# Patient Record
Sex: FEMALE | Race: WHITE | Employment: FULL TIME | URBAN - METROPOLITAN AREA
[De-identification: names, ages, dates, MRNs, and addresses within clinical notes are randomized per-mention and may not be internally consistent; named-entity substitution may affect disease eponyms.]

---

## 2020-04-13 ENCOUNTER — APPOINTMENT (OUTPATIENT)
Dept: GENERAL RADIOLOGY | Age: 60
End: 2020-04-13
Payer: COMMERCIAL

## 2020-04-13 ENCOUNTER — HOSPITAL ENCOUNTER (EMERGENCY)
Age: 60
Discharge: HOME | End: 2020-04-13
Attending: EMERGENCY MEDICINE
Payer: COMMERCIAL

## 2020-04-13 VITALS
HEART RATE: 80 BPM | TEMPERATURE: 98.5 F | WEIGHT: 235 LBS | DIASTOLIC BLOOD PRESSURE: 97 MMHG | RESPIRATION RATE: 16 BRPM | HEIGHT: 67 IN | SYSTOLIC BLOOD PRESSURE: 136 MMHG | OXYGEN SATURATION: 96 % | BODY MASS INDEX: 36.88 KG/M2

## 2020-04-13 PROCEDURE — 73562 X-RAY EXAM OF KNEE 3: CPT

## 2020-04-13 PROCEDURE — 6370000000 HC RX 637 (ALT 250 FOR IP): Performed by: EMERGENCY MEDICINE

## 2020-04-13 PROCEDURE — 99283 EMERGENCY DEPT VISIT LOW MDM: CPT

## 2020-04-13 RX ORDER — ACETAMINOPHEN 500 MG
1000 TABLET ORAL EVERY 4 HOURS PRN
Status: DISCONTINUED | OUTPATIENT
Start: 2020-04-13 | End: 2020-04-13 | Stop reason: HOSPADM

## 2020-04-13 RX ORDER — ACETAMINOPHEN 500 MG
1000 TABLET ORAL ONCE
Status: CANCELLED | OUTPATIENT
Start: 2020-04-13 | End: 2020-04-13

## 2020-04-13 RX ADMIN — ACETAMINOPHEN 1000 MG: 500 TABLET ORAL at 14:38

## 2020-04-13 SDOH — HEALTH STABILITY: MENTAL HEALTH: HOW OFTEN DO YOU HAVE A DRINK CONTAINING ALCOHOL?: NEVER

## 2020-04-13 ASSESSMENT — PAIN SCALES - GENERAL
PAINLEVEL_OUTOF10: 9
PAINLEVEL_OUTOF10: 5
PAINLEVEL_OUTOF10: 9

## 2020-04-13 ASSESSMENT — PAIN DESCRIPTION - PAIN TYPE: TYPE: CHRONIC PAIN

## 2020-04-13 ASSESSMENT — ENCOUNTER SYMPTOMS
WHEEZING: 0
RHINORRHEA: 0
NAUSEA: 0
PHOTOPHOBIA: 0
BACK PAIN: 0
ABDOMINAL DISTENTION: 0
COUGH: 0
VOMITING: 0
SHORTNESS OF BREATH: 0
DIARRHEA: 0

## 2020-04-13 ASSESSMENT — PAIN DESCRIPTION - ORIENTATION: ORIENTATION: RIGHT

## 2020-04-13 ASSESSMENT — PAIN DESCRIPTION - LOCATION: LOCATION: KNEE

## 2020-04-13 NOTE — ED PROVIDER NOTES
hematuria. Musculoskeletal: Positive for arthralgias. Negative for back pain and neck pain. Skin: Negative for rash and wound. Neurological: Negative for syncope and weakness. Psychiatric/Behavioral: Negative for agitation and confusion. Exam  ED Triage Vitals [04/13/20 1348]   BP Temp Temp Source Pulse Resp SpO2 Height Weight   (!) 178/93 98.2 °F (36.8 °C) Oral 90 16 95 % 5' 7\" (1.702 m) 235 lb (106.6 kg)       Physical Exam  Vitals signs and nursing note reviewed. Constitutional:       General: She is not in acute distress. Appearance: She is well-developed. HENT:      Head: Normocephalic and atraumatic. Eyes:      Pupils: Pupils are equal, round, and reactive to light. Neck:      Musculoskeletal: Normal range of motion and neck supple. Cardiovascular:      Rate and Rhythm: Normal rate and regular rhythm. Heart sounds: No murmur. Pulmonary:      Effort: Pulmonary effort is normal.      Breath sounds: Normal breath sounds. Abdominal:      General: There is no distension. Palpations: Abdomen is soft. Tenderness: There is no abdominal tenderness. Musculoskeletal: Normal range of motion. General: No deformity. Comments: The right knee is not significantly enlarged compared to contralateral side, there is no significant tenderness along the medial or lateral lines of the joint. There is small effusion noted around the patella. Near normal active and passive range of motion of the knee and all other joints lower extremities. Negative anterior drawer sign. Neurovascular intact distally. Skin:     General: Skin is warm. Findings: No rash. Neurological:      Mental Status: She is alert and oriented to person, place, and time. Motor: No abnormal muscle tone.    Psychiatric:         Behavior: Behavior normal.           ED Course    ED Medication Orders (From admission, onward)    Start Ordered     Status Ordering Provider    04/13/20 0209

## 2020-04-13 NOTE — ED NOTES
Discharge instructions were reviewed with the patient and the patient verbalized understanding. Patient IV was removed with no complications. Patient stable and brought out of ED via wheelchair, picked up by her .        Lolita Rao RN  04/13/20 9241

## 2020-04-16 ENCOUNTER — OFFICE VISIT (OUTPATIENT)
Dept: ORTHOPEDIC SURGERY | Age: 60
End: 2020-04-16
Payer: COMMERCIAL

## 2020-04-16 VITALS
DIASTOLIC BLOOD PRESSURE: 92 MMHG | WEIGHT: 230 LBS | SYSTOLIC BLOOD PRESSURE: 136 MMHG | HEIGHT: 65 IN | BODY MASS INDEX: 38.32 KG/M2

## 2020-04-16 PROCEDURE — L1812 KO ELASTIC W/JOINTS PRE OTS: HCPCS | Performed by: ORTHOPAEDIC SURGERY

## 2020-04-16 PROCEDURE — 99203 OFFICE O/P NEW LOW 30 MIN: CPT | Performed by: ORTHOPAEDIC SURGERY

## 2020-04-16 PROCEDURE — 20611 DRAIN/INJ JOINT/BURSA W/US: CPT | Performed by: ORTHOPAEDIC SURGERY

## 2020-04-16 RX ORDER — METHYLPREDNISOLONE ACETATE 40 MG/ML
80 INJECTION, SUSPENSION INTRA-ARTICULAR; INTRALESIONAL; INTRAMUSCULAR; SOFT TISSUE ONCE
Status: COMPLETED | OUTPATIENT
Start: 2020-04-16 | End: 2020-04-16

## 2020-04-16 RX ORDER — LIDOCAINE HYDROCHLORIDE 10 MG/ML
4 INJECTION, SOLUTION INFILTRATION; PERINEURAL ONCE
Status: COMPLETED | OUTPATIENT
Start: 2020-04-16 | End: 2020-04-16

## 2020-04-16 RX ORDER — BUPIVACAINE HYDROCHLORIDE 2.5 MG/ML
4 INJECTION, SOLUTION INFILTRATION; PERINEURAL ONCE
Status: COMPLETED | OUTPATIENT
Start: 2020-04-16 | End: 2020-04-16

## 2020-04-16 RX ADMIN — METHYLPREDNISOLONE ACETATE 80 MG: 40 INJECTION, SUSPENSION INTRA-ARTICULAR; INTRALESIONAL; INTRAMUSCULAR; SOFT TISSUE at 10:26

## 2020-04-16 RX ADMIN — LIDOCAINE HYDROCHLORIDE 4 ML: 10 INJECTION, SOLUTION INFILTRATION; PERINEURAL at 10:26

## 2020-04-16 RX ADMIN — BUPIVACAINE HYDROCHLORIDE 10 MG: 2.5 INJECTION, SOLUTION INFILTRATION; PERINEURAL at 10:27

## 2020-04-16 NOTE — LETTER
[place] [id number] [facility] [address]  Phone: [phone]  Fax: [phone]    Olinda Wilson MD        April 16, 2020     Patient: Jimena Ortiz   YOB: 1960   Date of Visit: 4/16/2020       To Whom It May Concern: It is my medical opinion that Jimena Ortiz needs to remain out of work due to orthopedic injury of her Left knee through May 3, 2020. Anticipated return to work date on May 4, 2020. If you have any questions or concerns, please don't hesitate to call.     Sincerely,        Olinda Wilson MD

## 2020-04-16 NOTE — PROGRESS NOTES
CHIEF COMPLAINT:    Chief Complaint   Patient presents with    Knee Pain     Right Knee -= OA  ER 04/13/2020       HISTORY OF PRESENT ILLNESS:    Presents today for evaluation treatment of her right knee. She reports that she has been in the emergency room a couple of days ago with ongoing disabling right knee pain is been bothering her for about 6 months. Is now gotten to the point that she is having to ambulate with with crutches. The patient is a 60 y.o. female   No past medical history on file. Work Status: She works as a  for a business. She does have to do some ambulating. The pain assessment was noted & is as follows:  Pain Assessment  Location of Pain: Knee  Quality of Pain: Sharp, Dull, Throbbing  Duration of Pain: Persistent  Frequency of Pain: Constant  Date Pain First Started: 04/12/20  Aggravating Factors: Squatting, Standing, Walking  Limiting Behavior: Yes  Result of Injury: No]      Work Status/Functionality:     Past Medical History: Medical history form was reviewed today & can be found in the media tab  No past medical history on file. Past Surgical History:     Past Surgical History:   Procedure Laterality Date    APPENDECTOMY      TONSILLECTOMY       Current Medications:   No current outpatient medications on file. Allergies:  Patient has no known allergies. Social History:    reports that she has quit smoking. Her smoking use included cigarettes. She does not have any smokeless tobacco history on file. She reports that she does not drink alcohol or use drugs. Family History:   No family history on file. REVIEW OF SYSTEMS:   For new problems, a full review of systems will be found scanned in the patient's chart.   CONSTITUTIONAL: Denies unexplained weight loss, fevers, chills   NEUROLOGICAL: Denies unsteady gait or progressive weakness  SKIN: Denies skin changes, delayed healing, rash, itching       PHYSICAL EXAM:    Vitals: Blood pressure (!) 136/92, talked about treatment of arthritis and the various options that are involved with this. The patient understands that the treatments can vary from essentially doing nothing to a total joint replacement arthroplasty for arthritis. I then went on to describe the utilization of glucosamine and chondroitin sulfate as a joint nutrition product. We talked about the fact that this is essentially a joint vitamin with typically minimal side effects. We also talked about utilization of prescription over-the-counter anti-inflammatory medications as the next option. We also discussed the possibility of brace wear or orthotic wear if the patient has significant varus alignment. We discussed the importance of a reasonable body mass as well. They understand that obesity can impact joint pain and accelerate the development of osteoarthritis. We then went on to discuss the possibility of Visco supplementation with hyaluronate products. We talked about the typical course of this type of treatment and the fact that often times in the treatment for significant arthritis, this is successful less than half the time. We also talked about the corticosteroid injections and the fact that this can give a brief window of relief, but does not cure the problem; in fact, the pain often has a rebound effect in 6-10 weeks after the steroid has worn off. We also discussed arthroscopy surgery in attempts to debride the joint, but the fact that this is relatively unreliable treatment in the face of significant arthritis. It can occasionally be used, particularly if there is significant meniscus pathology. Lastly we discussed total joint replacement arthroplasty as the final and definitive step in treatment of arthritis. Patient realizes the magnitude of this type of treatment as well as having voiced a general understanding to the duration of the prosthesis. The patient voiced understanding to these continuum of treatment options.     Although she is ligamentously stable today, we need to continually monitor the integrity of her MCL. Were going administer a right knee cortisone injection today and use a brace for support and stability. 2 weeks off work. Follow-up in approximately 4 to 6 weeks for reevaluation. She understands she will likely need a joint replacement, possibly sooner rather than later depending on the integrity of her ligamentous structures    I discussed in detail the risks, benefits and complications of an injection which included but are not limited to infection, skin reactions, hot swollen joint, and anaphylaxis with the patient. The patient verbalized understanding and gave informed consent for the injection. The patient's knee was flexed to 90° and the skin prepped using sterile alcohol solution. A sterile 22-gauge needle was inserted into the knee and the mixture of 4 mL of 2% Carbocaine, 4 mL of 0.25% Marcaine, and 80 mg of Depo-Medrol was injected under sterile technique. The needle was withdrawn and the puncture site sealed with a Band-Aid. Technique: Under sterile conditions a [employer] Once! ultrasound unit with a variable frequency (6.0-15.0 MHz) linear transducer was used to localize the placement of a 22-gauge needle into the Right knee joint. Findings: Successful needle placement for knee injection. Final images were taken and saved for permanent record. The patient tolerated the injection well. The patient was instructed to call the office immediately if there is any pain, redness, warmth, fever, or chills. I have personally performed and/or participated in the history, exam and medical decision making and agree with all pertinent clinical information. I have also reviewed and agree with the past medical, family and social history unless otherwise noted. This dictation was performed with a verbal recognition program (DRAGON) and it was checked for errors.  It is possible that there are still dictated errors within this office note. If so, please bring any errors to my attention for an addendum. All efforts were made to ensure that this office note is accurate.           Val Coronel MD

## 2020-05-14 ENCOUNTER — OFFICE VISIT (OUTPATIENT)
Dept: ORTHOPEDIC SURGERY | Age: 60
End: 2020-05-14
Payer: COMMERCIAL

## 2020-05-14 VITALS — HEIGHT: 65 IN | WEIGHT: 229.94 LBS | BODY MASS INDEX: 38.31 KG/M2

## 2020-05-14 PROCEDURE — 99213 OFFICE O/P EST LOW 20 MIN: CPT | Performed by: ORTHOPAEDIC SURGERY

## 2020-05-14 NOTE — PROGRESS NOTES
5' 5\" (1.651 m), weight 229 lb 15 oz (104.3 kg). GENERAL EXAM:  · General Apparence: Patient is adequately groomed with no evidence of malnutrition. · Orientation: The patient is oriented to time, place and person. · Mood & Affect:The patient's mood and affect are appropriate       Knee PHYSICAL EXAMINATION:  · Inspection: Valgus alignment of about 24 degrees. 1+ effusion. · Palpation: Tenderness along the lateral joint space and peripatellar region primarily. She also some discomfort along the course of the MCL. · Range of Motion: 0-1 and 35 degrees. · Strength: 5/5    · Special Tests: MCL is completely intact at 0 30 and 60 degrees of flexion but she does have pain on valgus testing. LCL ACL and PCL are all intact. · Skin:  There are no rashes, ulcerations or lesions. · There are no distal dysvascular changes     Gait & station: Utilizing a cane      Additional Examinations:        Is similar findings on the LEFT knee. Fairly dramatic valgus alignment with no MCL instability. Good range of motion. No gross instability. Logroll examination. Negative straight leg raise examination. Good hip range of motion. Diagnostic Testing: The following x rays were read and interpreted by myself     The following x rays were read and interpreted by myself                       I reviewed x-rays from the emergency department and a single AP standing x-ray obtained today in the office. The patient has absolutely end-stage bone-on-bone osteoarthritic change of the lateral compartment and severe patellofemoral disease as well. Valgus angles approximately 25 degrees     Orders   No orders of the defined types were placed in this encounter. Assessment / Treatment Plan:     1. End-stage lateral compartment patellofemoral arthritis of the right knee. We discussed treatment options again at length. We talked about Visco supplementation brace work such a.   She wants to go ahead and pursue joint replacement surgery. We talked about the surgery in detail close rehabilitation. She could expect probably 6 or 8 weeks off of work. 2.  Demand  matching tool completed. She matches to a [employer] replacement. We discussed the risk, benefits, and potential complications of total knee replacement arthroplasty surgery. The patient voiced their understanding to concerns that include infection, deep vein thrombosis, neurological injury, and delayed rehabilitation. The patient also realizes that there are concerns regarding the potential need for manipulation under anesthesia if range of motion proves to be problematic. The patient also understands that there is always a chance of dystrophy and anesthetic complications that would include a stroke, cardiopulmonary pathology, and even death. We also discussed the rehabilitation involved with this operation and options that involved not only the hospitalization but then the potential of either going home with visiting home therapy versus going to a rehabilitation facility. We talked about the nuances of each of these treatments. The patient also realizes the need for the knee brace in the rehabilitation process as well as the very significant role that the patient plays in terms of rehabilitation after this type of operation. All questions were answered. 3.  See no specific complicating factors for her. I have personally performed and/or participated in the history, exam and medical decision making and agree with all pertinent clinical information. I have also reviewed and agree with the past medical, family and social history unless otherwise noted. This dictation was performed with a verbal recognition program (DRAGON) and it was checked for errors. It is possible that there are still dictated errors within this office note. If so, please bring any errors to my attention for an addendum.  All efforts were made to ensure

## 2020-06-17 ENCOUNTER — TELEPHONE (OUTPATIENT)
Dept: ORTHOPEDIC SURGERY | Age: 60
End: 2020-06-17

## 2020-06-23 ENCOUNTER — TELEPHONE (OUTPATIENT)
Dept: ORTHOPEDIC SURGERY | Age: 60
End: 2020-06-23

## 2020-06-23 NOTE — TELEPHONE ENCOUNTER
COVID; 7/8/2020- Negative   Pt got labs done at PCP    Pt would like to DC same day if possible  No preference on [address]    Orthopedic Nurse Navigator Summary  -  Patient Name: Jorje Severin  Anticipated Date of Surgery:  Using OrthoVitals? Yes, Are they Registered: Yes  Attended Pre-Op Education Class: If No, why not? PCP:  Phone #:  Date of PCP Visit for H&P: 06/29/2020  Any Noted Concerns from PCP prior to surgery: No  If Yes, what concerns?:  Is the Patient in a Pain Management Program?: No  Review of Past Medical History Reveals History of:  -  Critical Lab Values:  - Hemoglobin (g/dL): Date Value  - Hematocrit (%): Date Value  - HgbA1C : Date Value  - Albumin : Date Value  - BUN (mg/dL) : Date Value  - CREATININE (mg/dL) : Date Value  - BMI (kg/m2) : Date Value  -  Coronary Artery Disease/HTN/CHF History: No  Cardiologist:  Cardiac Clearance Necessary:  Date of Cardiac Clearance Appt: On Plavix? No  If YES, when will they stop taking? Final Cardiac Recommendations: On any anticoagulation: No  -  Diabetes History: No  Most Recent HgbA1C:  PCP or Endocrine Recommendations:  Nutritionist/Dietician Consult Scheduled:  Final Plan For Diabetic Control:  Pulmonary: COPD/Emphysema/ Use of home oxygen: NONE  Alcohol use: Non-Drinker  -  DVT Risk Stratification: Low Risk  Vascular Consult Ordered:  Date of Vascular Appt:  Hematology/Oncology Consult Ordered:  Date of Hematology/Oncology Appt:  Final Recommendation For DVT Prophylaxis:  Smoking history: Non-Smoker  Use of Estrogen:  -  BMI Greater than 40 at time of scheduling?: No  Has Surgeon been notified of BMI concern? No  Weight Loss Clinic Consult Ordered No  Date of Wt Loss Clinic Appt:  BMI at time of surgery (if went through [facility]):  -  Additional Medical Concerns: Additional Recommendations for above concerns:  Attended Pre-Hab Program: No  Anticipated Discharge Disposition: D/C home  Who will be with patient at home following discharge?  Significant other will be home, also sister can check in  Equipment patient already has: Shower seat, cane  Bedroom on first or second floor: First  Bathroom on first or second floor: First  Weight bearing status: full  Pre-op ambulatory status:  Number of entry steps: ZERO  Caregiver assistance: Full time  Coreen Mayorga [facility]  06/30/2020

## 2020-07-01 ENCOUNTER — TELEPHONE (OUTPATIENT)
Dept: ORTHOPEDIC SURGERY | Age: 60
End: 2020-07-01

## 2020-07-06 ENCOUNTER — ANESTHESIA EVENT (OUTPATIENT)
Dept: OPERATING ROOM | Age: 60
End: 2020-07-06
Payer: COMMERCIAL

## 2020-07-08 ENCOUNTER — TELEPHONE (OUTPATIENT)
Dept: ORTHOPEDIC SURGERY | Age: 60
End: 2020-07-08

## 2020-07-08 ENCOUNTER — HOSPITAL ENCOUNTER (OUTPATIENT)
Dept: PREADMISSION TESTING | Age: 60
Discharge: HOME | End: 2020-07-12
Payer: COMMERCIAL

## 2020-07-08 ENCOUNTER — OFFICE VISIT (OUTPATIENT)
Dept: PRIMARY CARE CLINIC | Age: 60
End: 2020-07-08
Payer: COMMERCIAL

## 2020-07-08 LAB
ABO/RH: NORMAL
ANTIBODY SCREEN: NORMAL
BILIRUBIN URINE: NEGATIVE
BLOOD, URINE: ABNORMAL
CLARITY: CLEAR
COLOR: YELLOW
EPITHELIAL CELLS, UA: ABNORMAL /HPF (ref 0–5)
GLUCOSE URINE: NEGATIVE MG/DL
KETONES, URINE: NEGATIVE MG/DL
LEUKOCYTE ESTERASE, URINE: NEGATIVE
MICROSCOPIC EXAMINATION: YES
MUCUS: ABNORMAL /LPF
NITRITE, URINE: NEGATIVE
PH UA: 6 (ref 5–8)
PROTEIN UA: NEGATIVE MG/DL
RBC UA: ABNORMAL /HPF (ref 0–4)
SPECIFIC GRAVITY UA: >=1.03 (ref 1–1.03)
TRANSFERRIN: 243 MG/DL (ref 200–360)
URINE TYPE: ABNORMAL
UROBILINOGEN, URINE: 0.2 E.U./DL
WBC UA: ABNORMAL /HPF (ref 0–5)

## 2020-07-08 PROCEDURE — 84466 ASSAY OF TRANSFERRIN: CPT

## 2020-07-08 PROCEDURE — 86901 BLOOD TYPING SEROLOGIC RH(D): CPT

## 2020-07-08 PROCEDURE — 86850 RBC ANTIBODY SCREEN: CPT

## 2020-07-08 PROCEDURE — 81001 URINALYSIS AUTO W/SCOPE: CPT

## 2020-07-08 PROCEDURE — 99211 OFF/OP EST MAY X REQ PHY/QHP: CPT | Performed by: NURSE PRACTITIONER

## 2020-07-08 PROCEDURE — 36415 COLL VENOUS BLD VENIPUNCTURE: CPT

## 2020-07-08 PROCEDURE — 87086 URINE CULTURE/COLONY COUNT: CPT

## 2020-07-08 PROCEDURE — 86900 BLOOD TYPING SEROLOGIC ABO: CPT

## 2020-07-08 NOTE — PROGRESS NOTES
Jennie Crockett received a viral test for COVID-19. They were educated on isolation and quarantine as appropriate. For any symptoms, they were directed to seek care from their PCP, given contact information to establish with a doctor, directed to an urgent care or the emergency room.

## 2020-07-08 NOTE — TELEPHONE ENCOUNTER
Breanna [facility] [id number]    Date: 07/14/20  Type of SX:  INPATIENT CHANGED TO OUTPATIENT/23 HOUR HOLD  Location: [facility]  CPT: 28923   DX Code: M17.11  SX area: [place] KN[place]  Insurance: [address] [place] [address] 07/14/20 TO 10/12/20

## 2020-07-09 LAB — URINE CULTURE, ROUTINE: NORMAL

## 2020-07-09 NOTE — PROGRESS NOTES
Obstructive Sleep Apnea (IVET) Screening     Patient:  Tracey Trevino    YOB: 1960      Medical Record #:  [id number]                     Date:  7/9/2020     1. Are you a loud and/or regular snorer? [x]  Yes       [] No    2. Have you been observed to gasp or stop breathing during sleep? []  Yes       [x] No    3. Do you feel tired or groggy upon awakening or do you awaken with a headache?           []  Yes       [x] No    4. Are you often tired or fatigued during the wake time hours? []  Yes       [x] No    5. Do you fall asleep sitting, reading, watching TV or driving? []  Yes       [x] No    6. Do you often have problems with memory or concentration? []  Yes       [x] No    **If patient's score is ? 3 they are considered high risk for IVET. An Anesthesia provider will evaluate the patient and develop a plan of care the day of surgery. Note:  If the patient's BMI is more than 35 kg m¯² , has neck circumference > 40 cm, and/or high blood pressure the risk is greater (© American Sleep Apnea Association, 2006).

## 2020-07-11 LAB
SARS-COV-2: NOT DETECTED
SOURCE: NORMAL

## 2020-07-14 ENCOUNTER — ANESTHESIA (OUTPATIENT)
Dept: OPERATING ROOM | Age: 60
End: 2020-07-14
Payer: COMMERCIAL

## 2020-07-14 ENCOUNTER — APPOINTMENT (OUTPATIENT)
Dept: GENERAL RADIOLOGY | Age: 60
End: 2020-07-14
Attending: ORTHOPAEDIC SURGERY
Payer: COMMERCIAL

## 2020-07-14 ENCOUNTER — HOSPITAL ENCOUNTER (OUTPATIENT)
Age: 60
Discharge: HOME | End: 2020-07-15
Attending: ORTHOPAEDIC SURGERY | Admitting: ORTHOPAEDIC SURGERY
Payer: COMMERCIAL

## 2020-07-14 VITALS
TEMPERATURE: 98.2 F | OXYGEN SATURATION: 97 % | DIASTOLIC BLOOD PRESSURE: 60 MMHG | SYSTOLIC BLOOD PRESSURE: 91 MMHG | RESPIRATION RATE: 15 BRPM

## 2020-07-14 PROBLEM — Z96.651 STATUS POST TOTAL RIGHT KNEE REPLACEMENT: Status: ACTIVE | Noted: 2020-07-14

## 2020-07-14 PROBLEM — Z96.651 S/P TOTAL KNEE ARTHROPLASTY, RIGHT: Status: ACTIVE | Noted: 2020-07-14

## 2020-07-14 LAB
ABO/RH: NORMAL
ANION GAP SERPL CALCULATED.3IONS-SCNC: 12 MMOL/L (ref 3–16)
ANTIBODY SCREEN: NORMAL
BUN BLDV-MCNC: 11 MG/DL (ref 7–20)
CALCIUM SERPL-MCNC: 8.3 MG/DL (ref 8.3–10.6)
CHLORIDE BLD-SCNC: 107 MMOL/L (ref 99–110)
CO2: 24 MMOL/L (ref 21–32)
CREAT SERPL-MCNC: <0.5 MG/DL (ref 0.6–1.2)
GFR AFRICAN AMERICAN: >60
GFR NON-AFRICAN AMERICAN: >60
GLUCOSE BLD-MCNC: 94 MG/DL (ref 70–99)
HCT VFR BLD CALC: 41.3 % (ref 36–48)
HEMOGLOBIN: 13.6 G/DL (ref 12–16)
MAGNESIUM: 2.1 MG/DL (ref 1.8–2.4)
MCH RBC QN AUTO: 28.9 PG (ref 26–34)
MCHC RBC AUTO-ENTMCNC: 33 G/DL (ref 31–36)
MCV RBC AUTO: 87.7 FL (ref 80–100)
PDW BLD-RTO: 13.7 % (ref 12.4–15.4)
PLATELET # BLD: 261 K/UL (ref 135–450)
PMV BLD AUTO: 7.5 FL (ref 5–10.5)
POTASSIUM REFLEX MAGNESIUM: 3.5 MMOL/L (ref 3.5–5.1)
RBC # BLD: 4.71 M/UL (ref 4–5.2)
SODIUM BLD-SCNC: 143 MMOL/L (ref 136–145)
WBC # BLD: 8.1 K/UL (ref 4–11)

## 2020-07-14 PROCEDURE — 6370000000 HC RX 637 (ALT 250 FOR IP): Performed by: ANESTHESIOLOGY

## 2020-07-14 PROCEDURE — 2500000003 HC RX 250 WO HCPCS: Performed by: ANESTHESIOLOGY

## 2020-07-14 PROCEDURE — 3700000001 HC ADD 15 MINUTES (ANESTHESIA): Performed by: ORTHOPAEDIC SURGERY

## 2020-07-14 PROCEDURE — 86850 RBC ANTIBODY SCREEN: CPT

## 2020-07-14 PROCEDURE — 3700000000 HC ANESTHESIA ATTENDED CARE: Performed by: ORTHOPAEDIC SURGERY

## 2020-07-14 PROCEDURE — 76942 ECHO GUIDE FOR BIOPSY: CPT | Performed by: ANESTHESIOLOGY

## 2020-07-14 PROCEDURE — 2500000003 HC RX 250 WO HCPCS: Performed by: NURSE ANESTHETIST, CERTIFIED REGISTERED

## 2020-07-14 PROCEDURE — 88311 DECALCIFY TISSUE: CPT

## 2020-07-14 PROCEDURE — 2500000003 HC RX 250 WO HCPCS: Performed by: ORTHOPAEDIC SURGERY

## 2020-07-14 PROCEDURE — 6360000002 HC RX W HCPCS: Performed by: ANESTHESIOLOGY

## 2020-07-14 PROCEDURE — 88305 TISSUE EXAM BY PATHOLOGIST: CPT

## 2020-07-14 PROCEDURE — 3600000005 HC SURGERY LEVEL 5 BASE: Performed by: ORTHOPAEDIC SURGERY

## 2020-07-14 PROCEDURE — 97166 OT EVAL MOD COMPLEX 45 MIN: CPT

## 2020-07-14 PROCEDURE — 6360000002 HC RX W HCPCS: Performed by: PHYSICIAN ASSISTANT

## 2020-07-14 PROCEDURE — 6360000002 HC RX W HCPCS: Performed by: ORTHOPAEDIC SURGERY

## 2020-07-14 PROCEDURE — C1713 ANCHOR/SCREW BN/BN,TIS/BN: HCPCS | Performed by: ORTHOPAEDIC SURGERY

## 2020-07-14 PROCEDURE — 97530 THERAPEUTIC ACTIVITIES: CPT

## 2020-07-14 PROCEDURE — 7100000001 HC PACU RECOVERY - ADDTL 15 MIN: Performed by: ORTHOPAEDIC SURGERY

## 2020-07-14 PROCEDURE — 6370000000 HC RX 637 (ALT 250 FOR IP): Performed by: PHYSICIAN ASSISTANT

## 2020-07-14 PROCEDURE — 97162 PT EVAL MOD COMPLEX 30 MIN: CPT

## 2020-07-14 PROCEDURE — 80048 BASIC METABOLIC PNL TOTAL CA: CPT

## 2020-07-14 PROCEDURE — 2709999900 HC NON-CHARGEABLE SUPPLY: Performed by: ORTHOPAEDIC SURGERY

## 2020-07-14 PROCEDURE — 64447 NJX AA&/STRD FEMORAL NRV IMG: CPT | Performed by: ANESTHESIOLOGY

## 2020-07-14 PROCEDURE — 2580000003 HC RX 258: Performed by: ANESTHESIOLOGY

## 2020-07-14 PROCEDURE — 85027 COMPLETE CBC AUTOMATED: CPT

## 2020-07-14 PROCEDURE — 97110 THERAPEUTIC EXERCISES: CPT

## 2020-07-14 PROCEDURE — 6370000000 HC RX 637 (ALT 250 FOR IP): Performed by: INTERNAL MEDICINE

## 2020-07-14 PROCEDURE — 7100000000 HC PACU RECOVERY - FIRST 15 MIN: Performed by: ORTHOPAEDIC SURGERY

## 2020-07-14 PROCEDURE — 86901 BLOOD TYPING SEROLOGIC RH(D): CPT

## 2020-07-14 PROCEDURE — 83735 ASSAY OF MAGNESIUM: CPT

## 2020-07-14 PROCEDURE — C1776 JOINT DEVICE (IMPLANTABLE): HCPCS | Performed by: ORTHOPAEDIC SURGERY

## 2020-07-14 PROCEDURE — 2580000003 HC RX 258: Performed by: PHYSICIAN ASSISTANT

## 2020-07-14 PROCEDURE — C9290 INJ, BUPIVACAINE LIPOSOME: HCPCS | Performed by: ORTHOPAEDIC SURGERY

## 2020-07-14 PROCEDURE — 73560 X-RAY EXAM OF KNEE 1 OR 2: CPT

## 2020-07-14 PROCEDURE — 86900 BLOOD TYPING SEROLOGIC ABO: CPT

## 2020-07-14 PROCEDURE — 2580000003 HC RX 258: Performed by: ORTHOPAEDIC SURGERY

## 2020-07-14 PROCEDURE — 6360000002 HC RX W HCPCS: Performed by: NURSE ANESTHETIST, CERTIFIED REGISTERED

## 2020-07-14 PROCEDURE — 3600000015 HC SURGERY LEVEL 5 ADDTL 15MIN: Performed by: ORTHOPAEDIC SURGERY

## 2020-07-14 DEVICE — RALLY HV BONE CEMENT 40 GRAMS
Type: IMPLANTABLE DEVICE | Site: KNEE | Status: FUNCTIONAL
Brand: RALLY

## 2020-07-14 DEVICE — GENESIS II RESURFACING PATELLAR                                    PROSTHESIS  32MM
Type: IMPLANTABLE DEVICE | Site: KNEE | Status: FUNCTIONAL
Brand: GENESIS II

## 2020-07-14 DEVICE — LEGION POSTERIOR STABILIZED HIGH                                    FLEX HIGHLY CROSS LINKED                                    POLYETHYLENE SIZE 3-4 9MM
Type: IMPLANTABLE DEVICE | Site: KNEE | Status: FUNCTIONAL
Brand: LEGION

## 2020-07-14 DEVICE — LEGION POSTERIOR STABILIZED                                    OXINIUM FEMORAL SIZE 5 RIGHT
Type: IMPLANTABLE DEVICE | Site: KNEE | Status: FUNCTIONAL
Brand: LEGION

## 2020-07-14 DEVICE — GENESIS II NON-POROUS TIBIAL                                    BASEPLATE SIZE 4 RIGHT
Type: IMPLANTABLE DEVICE | Site: KNEE | Status: FUNCTIONAL
Brand: GENESIS II

## 2020-07-14 RX ORDER — FENTANYL CITRATE 50 UG/ML
25 INJECTION, SOLUTION INTRAMUSCULAR; INTRAVENOUS EVERY 5 MIN PRN
Status: DISCONTINUED | OUTPATIENT
Start: 2020-07-14 | End: 2020-07-14 | Stop reason: HOSPADM

## 2020-07-14 RX ORDER — LABETALOL HYDROCHLORIDE 5 MG/ML
5 INJECTION, SOLUTION INTRAVENOUS EVERY 10 MIN PRN
Status: DISCONTINUED | OUTPATIENT
Start: 2020-07-14 | End: 2020-07-14 | Stop reason: HOSPADM

## 2020-07-14 RX ORDER — GABAPENTIN 300 MG/1
300 CAPSULE ORAL 3 TIMES DAILY
Status: DISCONTINUED | OUTPATIENT
Start: 2020-07-14 | End: 2020-07-15 | Stop reason: HOSPADM

## 2020-07-14 RX ORDER — LIDOCAINE HYDROCHLORIDE 20 MG/ML
INJECTION, SOLUTION INFILTRATION; PERINEURAL PRN
Status: DISCONTINUED | OUTPATIENT
Start: 2020-07-14 | End: 2020-07-14 | Stop reason: SDUPTHER

## 2020-07-14 RX ORDER — GABAPENTIN 300 MG/1
300 CAPSULE ORAL ONCE
Status: COMPLETED | OUTPATIENT
Start: 2020-07-14 | End: 2020-07-14

## 2020-07-14 RX ORDER — SODIUM CHLORIDE 0.9 % (FLUSH) 0.9 %
10 SYRINGE (ML) INJECTION PRN
Status: DISCONTINUED | OUTPATIENT
Start: 2020-07-14 | End: 2020-07-15 | Stop reason: HOSPADM

## 2020-07-14 RX ORDER — ACETAMINOPHEN 500 MG
1000 TABLET ORAL ONCE
Status: COMPLETED | OUTPATIENT
Start: 2020-07-14 | End: 2020-07-14

## 2020-07-14 RX ORDER — OXYCODONE HYDROCHLORIDE 5 MG/1
10 TABLET ORAL PRN
Status: DISCONTINUED | OUTPATIENT
Start: 2020-07-14 | End: 2020-07-14 | Stop reason: HOSPADM

## 2020-07-14 RX ORDER — PROMETHAZINE HYDROCHLORIDE 25 MG/ML
6.25 INJECTION, SOLUTION INTRAMUSCULAR; INTRAVENOUS
Status: DISCONTINUED | OUTPATIENT
Start: 2020-07-14 | End: 2020-07-14 | Stop reason: HOSPADM

## 2020-07-14 RX ORDER — OXYCODONE HYDROCHLORIDE AND ACETAMINOPHEN 5; 325 MG/1; MG/1
1-2 TABLET ORAL EVERY 6 HOURS PRN
Qty: 48 TABLET | Refills: 0 | Status: SHIPPED
Start: 2020-07-14 | End: 2020-07-15 | Stop reason: HOSPADM

## 2020-07-14 RX ORDER — SODIUM CHLORIDE 0.9 % (FLUSH) 0.9 %
10 SYRINGE (ML) INJECTION PRN
Status: DISCONTINUED | OUTPATIENT
Start: 2020-07-14 | End: 2020-07-14 | Stop reason: HOSPADM

## 2020-07-14 RX ORDER — MIDAZOLAM HYDROCHLORIDE 5 MG/ML
INJECTION INTRAMUSCULAR; INTRAVENOUS PRN
Status: DISCONTINUED | OUTPATIENT
Start: 2020-07-14 | End: 2020-07-14 | Stop reason: SDUPTHER

## 2020-07-14 RX ORDER — PROMETHAZINE HYDROCHLORIDE 25 MG/1
12.5 TABLET ORAL ONCE
Status: COMPLETED | OUTPATIENT
Start: 2020-07-14 | End: 2020-07-14

## 2020-07-14 RX ORDER — OXYCODONE HYDROCHLORIDE 5 MG/1
5 TABLET ORAL PRN
Status: DISCONTINUED | OUTPATIENT
Start: 2020-07-14 | End: 2020-07-14 | Stop reason: HOSPADM

## 2020-07-14 RX ORDER — CELECOXIB 100 MG/1
100 CAPSULE ORAL 2 TIMES DAILY
Status: DISCONTINUED | OUTPATIENT
Start: 2020-07-14 | End: 2020-07-15 | Stop reason: HOSPADM

## 2020-07-14 RX ORDER — MORPHINE SULFATE 2 MG/ML
2 INJECTION, SOLUTION INTRAMUSCULAR; INTRAVENOUS
Status: DISCONTINUED | OUTPATIENT
Start: 2020-07-14 | End: 2020-07-15

## 2020-07-14 RX ORDER — SENNA AND DOCUSATE SODIUM 50; 8.6 MG/1; MG/1
1 TABLET, FILM COATED ORAL 2 TIMES DAILY
Status: DISCONTINUED | OUTPATIENT
Start: 2020-07-14 | End: 2020-07-15 | Stop reason: HOSPADM

## 2020-07-14 RX ORDER — FENTANYL CITRATE 50 UG/ML
50 INJECTION, SOLUTION INTRAMUSCULAR; INTRAVENOUS EVERY 5 MIN PRN
Status: DISCONTINUED | OUTPATIENT
Start: 2020-07-14 | End: 2020-07-14 | Stop reason: HOSPADM

## 2020-07-14 RX ORDER — BUPIVACAINE HYDROCHLORIDE 5 MG/ML
INJECTION, SOLUTION EPIDURAL; INTRACAUDAL PRN
Status: DISCONTINUED | OUTPATIENT
Start: 2020-07-14 | End: 2020-07-14 | Stop reason: SDUPTHER

## 2020-07-14 RX ORDER — SODIUM CHLORIDE 9 MG/ML
INJECTION, SOLUTION INTRAVENOUS CONTINUOUS
Status: DISCONTINUED | OUTPATIENT
Start: 2020-07-14 | End: 2020-07-15 | Stop reason: HOSPADM

## 2020-07-14 RX ORDER — OXYCODONE HYDROCHLORIDE 5 MG/1
10 TABLET ORAL EVERY 4 HOURS PRN
Status: DISCONTINUED | OUTPATIENT
Start: 2020-07-14 | End: 2020-07-15

## 2020-07-14 RX ORDER — SODIUM CHLORIDE 0.9 % (FLUSH) 0.9 %
10 SYRINGE (ML) INJECTION EVERY 12 HOURS SCHEDULED
Status: DISCONTINUED | OUTPATIENT
Start: 2020-07-14 | End: 2020-07-15 | Stop reason: HOSPADM

## 2020-07-14 RX ORDER — MORPHINE SULFATE 4 MG/ML
4 INJECTION, SOLUTION INTRAMUSCULAR; INTRAVENOUS
Status: DISCONTINUED | OUTPATIENT
Start: 2020-07-14 | End: 2020-07-15

## 2020-07-14 RX ORDER — CELECOXIB 100 MG/1
200 CAPSULE ORAL ONCE
Status: COMPLETED | OUTPATIENT
Start: 2020-07-14 | End: 2020-07-14

## 2020-07-14 RX ORDER — HYDRALAZINE HYDROCHLORIDE 20 MG/ML
5 INJECTION INTRAMUSCULAR; INTRAVENOUS EVERY 10 MIN PRN
Status: DISCONTINUED | OUTPATIENT
Start: 2020-07-14 | End: 2020-07-14 | Stop reason: HOSPADM

## 2020-07-14 RX ORDER — PROPOFOL 10 MG/ML
INJECTION, EMULSION INTRAVENOUS PRN
Status: DISCONTINUED | OUTPATIENT
Start: 2020-07-14 | End: 2020-07-14 | Stop reason: SDUPTHER

## 2020-07-14 RX ORDER — BUPIVACAINE HYDROCHLORIDE 7.5 MG/ML
INJECTION, SOLUTION INTRASPINAL PRN
Status: DISCONTINUED | OUTPATIENT
Start: 2020-07-14 | End: 2020-07-14 | Stop reason: SDUPTHER

## 2020-07-14 RX ORDER — OXYCODONE HYDROCHLORIDE 5 MG/1
5 TABLET ORAL EVERY 4 HOURS PRN
Status: DISCONTINUED | OUTPATIENT
Start: 2020-07-14 | End: 2020-07-15

## 2020-07-14 RX ORDER — SODIUM CHLORIDE, SODIUM LACTATE, POTASSIUM CHLORIDE, CALCIUM CHLORIDE 600; 310; 30; 20 MG/100ML; MG/100ML; MG/100ML; MG/100ML
INJECTION, SOLUTION INTRAVENOUS CONTINUOUS
Status: DISCONTINUED | OUTPATIENT
Start: 2020-07-14 | End: 2020-07-14

## 2020-07-14 RX ORDER — ONDANSETRON 2 MG/ML
4 INJECTION INTRAMUSCULAR; INTRAVENOUS
Status: COMPLETED | OUTPATIENT
Start: 2020-07-14 | End: 2020-07-14

## 2020-07-14 RX ORDER — PROPOFOL 10 MG/ML
INJECTION, EMULSION INTRAVENOUS CONTINUOUS PRN
Status: DISCONTINUED | OUTPATIENT
Start: 2020-07-14 | End: 2020-07-14 | Stop reason: SDUPTHER

## 2020-07-14 RX ORDER — ACETAMINOPHEN 325 MG/1
650 TABLET ORAL EVERY 6 HOURS
Status: DISCONTINUED | OUTPATIENT
Start: 2020-07-14 | End: 2020-07-15

## 2020-07-14 RX ORDER — MEPERIDINE HYDROCHLORIDE 50 MG/ML
12.5 INJECTION INTRAMUSCULAR; INTRAVENOUS; SUBCUTANEOUS EVERY 5 MIN PRN
Status: DISCONTINUED | OUTPATIENT
Start: 2020-07-14 | End: 2020-07-14 | Stop reason: HOSPADM

## 2020-07-14 RX ORDER — SODIUM CHLORIDE 0.9 % (FLUSH) 0.9 %
10 SYRINGE (ML) INJECTION EVERY 12 HOURS SCHEDULED
Status: DISCONTINUED | OUTPATIENT
Start: 2020-07-14 | End: 2020-07-14 | Stop reason: HOSPADM

## 2020-07-14 RX ADMIN — SODIUM CHLORIDE: 9 INJECTION, SOLUTION INTRAVENOUS at 18:10

## 2020-07-14 RX ADMIN — CELECOXIB 200 MG: 100 CAPSULE ORAL at 11:12

## 2020-07-14 RX ADMIN — GABAPENTIN 300 MG: 300 CAPSULE ORAL at 11:12

## 2020-07-14 RX ADMIN — CEFAZOLIN 2 G: 10 INJECTION, POWDER, FOR SOLUTION INTRAVENOUS at 20:38

## 2020-07-14 RX ADMIN — BUPIVACAINE HYDROCHLORIDE 2 ML: 7.5 INJECTION, SOLUTION SUBARACHNOID at 12:50

## 2020-07-14 RX ADMIN — ONDANSETRON 4 MG: 2 INJECTION INTRAMUSCULAR; INTRAVENOUS at 17:08

## 2020-07-14 RX ADMIN — LIDOCAINE HYDROCHLORIDE 80 MG: 20 INJECTION, SOLUTION INFILTRATION; PERINEURAL at 12:52

## 2020-07-14 RX ADMIN — SODIUM CHLORIDE, POTASSIUM CHLORIDE, SODIUM LACTATE AND CALCIUM CHLORIDE: 600; 310; 30; 20 INJECTION, SOLUTION INTRAVENOUS at 11:11

## 2020-07-14 RX ADMIN — MORPHINE SULFATE 4 MG: 4 INJECTION, SOLUTION INTRAMUSCULAR; INTRAVENOUS at 21:12

## 2020-07-14 RX ADMIN — PROMETHAZINE HYDROCHLORIDE 12.5 MG: 25 TABLET ORAL at 19:43

## 2020-07-14 RX ADMIN — CEFAZOLIN SODIUM 2 G: 10 INJECTION, POWDER, FOR SOLUTION INTRAVENOUS at 12:47

## 2020-07-14 RX ADMIN — PROPOFOL 50 MG: 10 INJECTION, EMULSION INTRAVENOUS at 13:01

## 2020-07-14 RX ADMIN — CELECOXIB 100 MG: 100 CAPSULE ORAL at 20:38

## 2020-07-14 RX ADMIN — FAMOTIDINE 20 MG: 10 INJECTION, SOLUTION INTRAVENOUS at 11:12

## 2020-07-14 RX ADMIN — GABAPENTIN 300 MG: 300 CAPSULE ORAL at 20:38

## 2020-07-14 RX ADMIN — ACETAMINOPHEN 1000 MG: 500 TABLET ORAL at 11:12

## 2020-07-14 RX ADMIN — OXYCODONE 10 MG: 5 TABLET ORAL at 19:46

## 2020-07-14 RX ADMIN — MIDAZOLAM HYDROCHLORIDE 5 MG: 5 INJECTION, SOLUTION INTRAMUSCULAR; INTRAVENOUS at 11:55

## 2020-07-14 RX ADMIN — BUPIVACAINE HYDROCHLORIDE 30 ML: 5 INJECTION, SOLUTION EPIDURAL; INTRACAUDAL; PERINEURAL at 11:55

## 2020-07-14 RX ADMIN — MIDAZOLAM HYDROCHLORIDE 5 MG: 5 INJECTION, SOLUTION INTRAMUSCULAR; INTRAVENOUS at 12:48

## 2020-07-14 RX ADMIN — DOCUSATE SODIUM 50 MG AND SENNOSIDES 8.6 MG 1 TABLET: 8.6; 5 TABLET, FILM COATED ORAL at 20:38

## 2020-07-14 RX ADMIN — PROPOFOL 50 MCG/KG/MIN: 10 INJECTION, EMULSION INTRAVENOUS at 12:52

## 2020-07-14 ASSESSMENT — PULMONARY FUNCTION TESTS
PIF_VALUE: 0
PIF_VALUE: 1
PIF_VALUE: 0
PIF_VALUE: 1
PIF_VALUE: 0

## 2020-07-14 ASSESSMENT — PAIN DESCRIPTION - ORIENTATION
ORIENTATION: RIGHT

## 2020-07-14 ASSESSMENT — PAIN SCALES - GENERAL
PAINLEVEL_OUTOF10: 8
PAINLEVEL_OUTOF10: 8
PAINLEVEL_OUTOF10: 0
PAINLEVEL_OUTOF10: 10
PAINLEVEL_OUTOF10: 8
PAINLEVEL_OUTOF10: 8

## 2020-07-14 ASSESSMENT — PAIN DESCRIPTION - LOCATION
LOCATION: KNEE

## 2020-07-14 ASSESSMENT — PAIN DESCRIPTION - PAIN TYPE
TYPE: SURGICAL PAIN

## 2020-07-14 ASSESSMENT — PAIN - FUNCTIONAL ASSESSMENT
PAIN_FUNCTIONAL_ASSESSMENT: 0-10
PAIN_FUNCTIONAL_ASSESSMENT: 0-10

## 2020-07-14 NOTE — CARE COORDINATION
Writer spoke with Jose De Jesus Lyons she is following pt for home care. Writer spoke with Soraida and Janelle in PACU/SDS, they will notify writer if pt has any other needs, they know how to get pt a walker if needed.   NAHED Godinez-RN

## 2020-07-14 NOTE — CARE COORDINATION
Critical access hospital    DC order noted, all docs needed have been faxed to [facility] for home care services.     Home care to see patient within 24-48 hrs    Rodolfo Carrasquillo RN, BSN CTN  [facility] [phone]

## 2020-07-14 NOTE — OP NOTE
[facility]                                OPERATIVE REPORT    PATIENT NAME: Yissel Sheikh                       :        1960  MED REC NO:   [id number]                          ROOM:  ACCOUNT NO:   [de-identified]                           ADMIT DATE: 2020  PROVIDER:     Tariq Danielle MD    DATE OF PROCEDURE:  2020    SERVICE:  Orthopedic Surgery. SURGEON:  Mona Abreu MD    FIRST ASSISTANT:  MARIO Douglas    SECOND ASSISTANT:  Dorys Hernandez SA    PREOPERATIVE DIAGNOSIS:  Severe osteoarthritis of the left knee, 715. 36. POSTOPERATIVE DIAGNOSIS:  Severe osteoarthritis of the left knee,  715.36. OPERATIVE PROCEDURE:  Left Vas[employer] total knee  replacement using size 5 Oxinium femoral component, size 4 tibial  component, 9-mm deep flexion XLPE polyethylene insert, and 32-mm onlay  patella. TOURNIQUET TIME:  300 mmHg for 11 minutes. The tourniquet was utilized  only for bone prep and cementing. DRAINS:  None. COMPLICATIONS:  None. X-RAYS:  None. ANTIBIOTICS:  As per SCIP protocol, based upon patient's age, weight,  and allergies. INSTRUMENT COUNT:  Correct x2. ESTIMATED BLOOD LOSS:  150 mL. DISPOSITION:  To the recovery room. X-rays in the recovery room ordered  of the operative knee. INDICATIONS FOR SURGERY:  She is 63years old. The above-mentioned  patient presents for elective total knee replacement arthroplasty on the  date of surgery. The history is that the patient has well-outlined  records from [facility]. The patient has failed  nonoperative measures with respect to control of patient's pain and  function. There has been an extensive discussion regarding the risks,  benefits, and potential complications of this procedure, as well as  normal rehabilitative protocol.   The patient specifically voiced  understanding to concerns with respect to surgical infection, deep vein  thrombosis, dystrophy, arthrofibrosis, delayed rehabilitation potential,  need for manipulation, periprosthetic fractures, neurologic injury, etc.  The patient voiced understanding to all of this and elected to have the  procedure done. All questions were answered. DETAILS OF SURGERY:  The patient was seen in the preanesthesia holding  area, and I personally initialed the operative site and answered any  further questions. They were then taken to the operating room, where  anesthesia was induced and maintained. This was well documented in  their records from the hospital with respect to the anesthesia personnel  and the type of anesthesia which they performed, including nerve blocks. The patient's leg was then positioned, prepped and draped in the usual  fashion for total knee replacement arthroplasty. ChloraPrep was  utilized as the preparation. The left leg was prepped and draped, and  Ioban drape was applied after the surgical incision was marked. A  longitudinal incision was made and then carried down through the skin  and soft tissues after inflation of the pneumatic tourniquet at 350  mmHg. A medial arthrotomy was performed with an incision passing just  along the most medial side of the extensor mechanism and the vastus  medialis obliquus. The patella was then retracted laterally. At this  point, careful attention was made to removal of osteophytes and soft  tissue balancing, with removal of any adhesions, particularly around the  lateral peripatellar region. Utilizing the Ybarra Joshua and [employer] protocol for the above-mentioned  prosthesis, the course of femoral and tibial cuts were made. Femoral  cuts were addressed first.  The distal cut first protocol was used with  the patient ultimately being sized for the aforementioned trial.  This  gave excellent fixation both in the anteroposterior and mediolateral  dimensions.     Attention was

## 2020-07-14 NOTE — H&P
I have reviewed the history and physical and examined the patient and find no relevant changes. I have reviewed with the patient and/or family the risks, benefits, and alternatives to the procedure. Patient being given increased dosage/quantity of opoid pain medication in excess of OSMB guidelines which noted a 30 MED daily of opioids due to the fact that he/she has undergone major orthopaedic surgery as outlined in rule 4731-11-13. Dosages and further duration of the pain medication will be re-evaluated at her post op visit in 2 weeks. Patient was educated on dosing expectations and limits of prescribing as a result of the new [facility] Board rules enacted August 31, 2017. Please also note that this is not the initial opoid prescription issued to this patient but a continuation of medication utilized during the hospital admission as noted in the medical record. OARRS report has also been utilized to screen for any abuse history or suspicious activity as outlined in [place]. All efforts have been taken to prevent abuse potential and misuse of opioid medications including education, screening, and close clinical follow up. Controlled Substance Monitoring:    Acute and Chronic Pain Monitoring:   RX Monitoring 7/14/2020   Periodic Controlled Substance Monitoring No signs of potential drug abuse or diversion identified.

## 2020-07-14 NOTE — PROGRESS NOTES
Precautions, Fall Risk, Weight Bearing  Required Braces or Orthoses?: Yes  Lower Extremity Weight Bearing Restrictions  Right Lower Extremity Weight Bearing: Weight Bearing As Tolerated  Required Braces or Orthoses  Right Lower Extremity Brace: Knee Brace  Position Activity Restriction  Other position/activity restrictions: Knee immobilizer - May remove once patient can do a straight leg raise in bed    Subjective   General  Chart Reviewed: Yes  Patient assessed for rehabilitation services?: Yes  Family / Caregiver Present: Yes(Pt's SO present at beginning of session)  Referring Practitioner: MARIO Botello  Diagnosis: R knee OA, s/p RIGHT TOTAL KNEE REPLACEMENT 7/14/20  Subjective  Subjective: Pt in bed on arrival, pleasant and agreeable to eval and treat  General Comment  Comments: Per RN okay to treat  Patient Currently in Pain: Denies  Pain Assessment  Pain Assessment: 0-10  Patient's Stated Pain Goal: No pain  Vital Signs  Pulse: 53  Resp: 16  BP: 120/70  Level of Consciousness: Alert  Patient Currently in Pain: Denies  Oxygen Therapy  SpO2: 100 %  O2 Device: Nasal cannula  O2 Flow Rate (L/min): 2 L/min  Social/Functional History  Social/Functional History  Lives With: Significant other  Type of Home: House  Home Layout: One level  Home Access: Level entry  Bathroom Shower/Tub: Walk-in shower  Bathroom Toilet: Handicap height  Bathroom Equipment: Shower chair  Home Equipment: Cane  ADL Assistance: Independent  Homemaking Assistance: Independent  Homemaking Responsibilities: Yes(shares with SO)  Ambulation Assistance: Independent(with cane)  Transfer Assistance: Independent  Active : Yes  Occupation: Full time employment  Type of occupation:   Additional Comments: Pt's SO is retired and available for 24/7 supervision/assist at d/c, also has a sister that lives close by that is available       Objective   Vision: Impaired  Vision Exceptions: Wears glasses for reading  Hearing: Within functional limits    Orientation  Overall Orientation Status: Within Functional Limits     Balance  Sitting Balance: Stand by assistance(to supervision)  Standing Balance: (Min A x2)  Standing Balance  Time: 1-2 minutes  Activity: transfer, brief mobility  Functional Mobility  Functional - Mobility Device: Standard Walker  Activity: (2-3 steps from EOB)  Assist Level: Dependent/Total(Min A x2, cues for technique, assist for balance and walker management)  ADL  Feeding: Independent  Grooming: Setup(seated EOB)  LE Dressing: Setup;Maximum assistance(for socks, knee immobilizer)  Tone RUE  RUE Tone: Normotonic  Tone LUE  LUE Tone: Normotonic  Coordination  Movements Are Fluid And Coordinated: Yes     Bed mobility  Supine to Sit: Stand by assistance(HOB slightly elevated, use of bedrail)  Sit to Supine: Moderate assistance(to bring BLE onto bed)  Scooting: Stand by assistance(to EOB and further toward [facility])  Transfers  Sit to stand: Dependent/Total(Min A x2)  Stand to sit: Dependent/Total(Min-Mod A x2 d/t pt with orthostasis)     Cognition  Overall Cognitive Status: Exceptions  Following Commands: Follows one step commands with repetition; Follows one step commands with increased time  Safety Judgement: Decreased awareness of need for assistance  Initiation: Requires cues for some  Sequencing: Requires cues for some        Sensation  Overall Sensation Status: (denies BUE)        LUE AROM (degrees)  LUE AROM : WFL  Left Hand AROM (degrees)  Left Hand AROM: WFL  RUE AROM (degrees)  RUE AROM : WFL  Right Hand AROM (degrees)  Right Hand AROM: WFL  LUE Strength  Gross LUE Strength: WFL  RUE Strength  Gross RUE Strength: WFL          Plan   Plan  Times per week: 4-6x/week  Current Treatment Recommendations: Strengthening, Balance Training, Functional Mobility Training, Endurance Training, Gait Training, Pain Management, Safety Education & Training, Positioning, Self-Care / ADL, Patient/Caregiver Education & Training,

## 2020-07-14 NOTE — ANESTHESIA PRE PROCEDURE
TempSrc:  Temporal     SpO2:  97% 98% 97%   Weight: 228 lb (103.4 kg) 228 lb (103.4 kg)     Height: 5' 7\" (1.702 m) 5' 7\" (1.702 m)                                                BP Readings from Last 3 Encounters:   07/14/20 (!) 169/89   04/16/20 (!) 136/92   04/13/20 (!) 136/97       NPO Status: Time of last liquid consumption: 2000                        Time of last solid consumption: 2000                        Date of last liquid consumption: 07/13/20                        Date of last solid food consumption: 07/13/20    BMI:   Wt Readings from Last 3 Encounters:   07/14/20 228 lb (103.4 kg)   05/14/20 229 lb 15 oz (104.3 kg)   04/16/20 230 lb (104.3 kg)     Body mass index is 35.71 kg/m². CBC:   Lab Results   Component Value Date    WBC 8.1 07/14/2020    RBC 4.71 07/14/2020    HGB 13.6 07/14/2020    HCT 41.3 07/14/2020    MCV 87.7 07/14/2020    RDW 13.7 07/14/2020     07/14/2020       CMP:   Lab Results   Component Value Date     07/14/2020    K 3.5 07/14/2020     07/14/2020    CO2 24 07/14/2020    BUN 11 07/14/2020    CREATININE <0.5 07/14/2020    GFRAA >60 07/14/2020    LABGLOM >60 07/14/2020    GLUCOSE 94 07/14/2020    CALCIUM 8.3 07/14/2020       POC Tests: No results for input(s): POCGLU, POCNA, POCK, POCCL, POCBUN, POCHEMO, POCHCT in the last 72 hours.     Coags: No results found for: PROTIME, INR, APTT    HCG (If Applicable): No results found for: PREGTESTUR, PREGSERUM, HCG, HCGQUANT     ABGs: No results found for: PHART, PO2ART, PBU1YSX, TEE1RCX, BEART, W5GBHJOJ     Type & Screen (If Applicable):  No results found for: LABABO, LABRH    Drug/Infectious Status (If Applicable):  No results found for: HIV, HEPCAB    COVID-19 Screening (If Applicable):   Lab Results   Component Value Date    COVID19 Not Detected 07/08/2020         Anesthesia Evaluation   no history of anesthetic complications:   Airway: Mallampati: II  TM distance: <3 FB   Neck ROM: limited  Mouth opening: > = 3

## 2020-07-14 NOTE — PROGRESS NOTES
Physical Therapy    Facility/Department: [facility] OR  Initial Assessment    NAME: Radha Sena  : 1960  MRN: [id number]    Date of Service: 2020    Discharge Recommendations:  24 hour supervision or assist, Home with Home health PT        Assessment   Body structures, Functions, Activity limitations: Decreased functional mobility ; Decreased endurance;Decreased ROM; Decreased strength  Assessment: Pt is 64 yo female POD 0, Right TKR, WBAT, immobilizer to be used until quad strength adequate. PLOF lives with boyfriend in single level home with level entry, amb indep with cane, indep ADLs. Pt seen in PACU and required min assist of 2 to complete bed mob, trasnfers and amb 3 ft with SW. Session limited by nausea, vomiting, lightheadedness. Pt plans to discharge home with boyfriend to assist. Provided handouts for HEP with pictures. Will benefit from additional Skilled PT to address current deficits. Pt will need Standard walker for home  Treatment Diagnosis: decreased mobility post R TKR  Specific instructions for Next Treatment: ex, functional mob, gait  Prognosis: Good  Decision Making: Medium Complexity  PT Education: Goals; General Safety;Gait Training;PT Role;Disease Specific Education;Plan of Care; Functional Mobility Training;Home Exercise Program;Weight-bearing Education;Transfer Training  REQUIRES PT FOLLOW UP: Yes  Activity Tolerance  Activity Tolerance: Patient Tolerated treatment well  Activity Tolerance: Vital signs stable, pt vomited during session, Pt became dizzy after 3 steps walking with assist of 2 to get pt back to [facility] safely       Patient Diagnosis(es): The primary encounter diagnosis was Status post total right knee replacement. Diagnoses of S/P total knee arthroplasty, right and Primary osteoarthritis of right knee were also pertinent to this visit. has no past medical history on file.    has a past surgical history that includes Appendectomy and Person assistance(for trunk and LEs)  Transfers  Sit to Stand: Minimal Assistance;2 Person Assistance  Stand to sit: Minimal Assistance;2 Person Assistance  Ambulation  WB Status: WBAT RLE  Surface: level tile  Device: Standard Walker  Assistance: 2 Person assistance;Minimal assistance  Quality of Gait: very short steps, needed constant cues for sequence, R knee buckling (mild)  Distance: 3 steps, distance limited by dizziness      Exercises  Straight Leg Raise: 10 x RLE assisted  Quad Sets: 10 x B  Heelslides: 10 x assisted RLE  Gluteal Sets: 10 x B  Knee Short Arc Quad: 10 x RLE assisted  Knee Active Range of Motion: 0 to 90 through bulky bandages, assisted  Ankle Pumps: 15 x B     Plan   Times per week: BID 7 days wk  Times per day: Twice a day  Specific instructions for Next Treatment: ex, functional mob, gait  Current Treatment Recommendations: Strengthening, Home Exercise Program, ROM, Safety Education & Training, Endurance Training, Functional Mobility Training, Transfer Training, Gait Training  Safety Devices  Type of devices:  All fall risk precautions in place, Call light within reach, Nurse notified, Gait belt, Patient at risk for falls, Left in bed    AM-PAC Score     AM-PAC Inpatient Mobility without Stair Climbing Raw Score : 11 (07/14/20 1643)  AM-PAC Inpatient without Stair Climbing T-Scale Score : 35.66 (07/14/20 1643)  Mobility Inpatient CMS 0-100% Score: 67.36 (07/14/20 1643)  Mobility Inpatient without Stair CMS G-Code Modifier : CL (07/14/20 1643)       Goals  Short term goals  Time Frame for Short term goals: 2 days (7/15)  Short term goal 1: Pt to perform bed mob SBA  Short term goal 2: pt to perform sit to and from stand transfers SBA  Short term goal 3: pt to amb with  ft SBA  Short term goal 4: pt to participate in 10 x each- MET  Short term goal 5: pt to voice understanding of use of knee immobilizer and general safety- MET  Patient Goals   Patient goals : \"to know my Home ex plan and how to use the knee brace\"- MET       Therapy Time   Individual Concurrent Group Co-treatment   Time In 8292         Time Out 1645         Minutes 30         Timed Code Treatment Minutes: 20 Minutes     If pt discharges prior to next PT session this note will serve as discharge summary.   Tayo Ortiz, PT

## 2020-07-14 NOTE — DISCHARGE SUMMARY
intact in the right lower and had intact pulses distally. Patients calf remained soft and showed no evidence of DVT. The patient was able to move their right lower extremity without any problems post-operatively. Physical therapy and occupational therapy were consulted and began working with the patient post-operatively. The patient progressed with PT/OT as would be expected and continued to improve through their stay. The patients pain was initially controlled with IV medications but we were able to transition to oral pain medications soon after arrival to the floor and their pain remained under good control through their hospital stay. From a medical standpoint the patient remained stable and continued to have the medicine team follow throughout their stay. The patient will be discharged at this time to Home  with their current diet restrictions and will continue to follow the precautions outlined to them by us and PT/OT. Condition on Discharge: Stable    Plan  Return visit in 2 weeks. .  Patient was instructed on the use of pain medications, the signs and symptoms of infection, and was given our number to call should they have any questions or concerns following discharge. For opioid prescriptions given at discharge the following statement is provided for compliance with [employer] rules. Patient being given increased dosage/quantity of opoid pain medication in excess of [employer] guidelines which noted a 30 MED daily of opioids due to the fact that he/she has undergone major orthopaedic surgery as outlined in rule 4731-11-13. Dosages and further duration of the pain medication will be re-evaluated at her post op visit in 2 weeks. Patient was educated on dosing expectations and limits of prescribing as a result of the new [facility] Board rules enacted August 31, 2017.   Please also note that this is not the initial opoid prescription issued to this patient but a continuation of medication utilized during the hospital admission as noted in the medical record. [place]ARRS report has also been utilized to screen for any abuse history or suspicious activity as outlined in [place]. All efforts have been taken to prevent abuse potential and misuse of opioid medications including education, screening, and close clinical follow up.

## 2020-07-14 NOTE — PROGRESS NOTES
PT, OT stated pt did not do well. Unable to take more than 3 steps. Became nauseous, dizzy and vomited small amt of emesis. Pt continued to be very weak and unable to participate with much energy. Notified Dr Donald Diallo

## 2020-07-14 NOTE — DISCHARGE INSTR - COC
Continuity of Care Form    Patient Name: Hughie Gottron   :  1960  MRN:  [id number]    Admit date:  2020  Discharge date:  ***    Code Status Order: Full Code   Advance Directives:   Advance Care Flowsheet Documentation     Date/Time Healthcare Directive Type of Healthcare Directive Copy in [address] Agent's Name Healthcare Agent's Phone Number    20 1057  Yes, patient has an advance directive for healthcare treatment --  Yes, copy in chart -- -- --    20 1610  Yes, patient has an advance directive for healthcare treatment --  Yes, copy in chart -- -- --          Admitting Physician:  Ramu Mcdonald MD  PCP: TAIWO Mejias    Discharging Nurse: [facility] Unit/Room#: [address] [facility]  Discharging Unit Phone Number: ***    Emergency Contact:   Extended Emergency Contact Information  Primary Emergency Contact: [place] [address] Phone: [phone]  Relation: Domestic Partner  Preferred language: English    Past Surgical History:  Past Surgical History:   Procedure Laterality Date    APPENDECTOMY      TONSILLECTOMY         Immunization History: There is no immunization history on file for this patient.     Active Problems:  Patient Active Problem List   Diagnosis Code    Status post total right knee replacement Z96.651    S/P total knee arthroplasty, right Z96.651       Isolation/Infection:   Isolation          No Isolation        Patient Infection Status     None to display          Nurse Assessment:  Last Vital Signs: /70   Pulse 57   Temp 97.2 °F (36.2 °C) (Temporal)   Resp 16   Ht 5' 7\" (1.702 m)   Wt 228 lb (103.4 kg)   SpO2 100%   BMI 35.71 kg/m²     Last documented pain score (0-10 scale): Pain Level: 10  Last Weight:   Wt Readings from Last 1 Encounters:   20 228 lb (103.4 kg)     Mental Status:  {IP PT MENTAL STATUS:}    IV Access:  { CHRISTINA IV ACCESS:993556548}    Nursing Mobility/ADLs:  Walking   {[facility] DME UKVF:473079742}  Transfer  {CHP DME TFPO:318482480}  Bathing  {CHP DME RBRX:488965252}  Dressing  {CHP DME MUXR:813077318}  Toileting  {CHP DME ZBJ}  Feeding  {CHP DME JPER:582053464}  Med Admin  {CHP DME LRBF:330173458}  Med Delivery   { CHRISTINA MED Delivery:578391547}    Wound Care Documentation and Therapy:        Elimination:  Continence:   · Bowel: {YES / AV:56301}  · Bladder: {YES / RU:43626}  Urinary Catheter: {Urinary Catheter:358366062}   Colostomy/Ileostomy/Ileal Conduit: {YES / NB:30071}       Date of Last BM: ***    Intake/Output Summary (Last 24 hours) at 2020 1533  Last data filed at 2020 1423  Gross per 24 hour   Intake 900 ml   Output --   Net 900 ml     I/O last 3 completed shifts:   In: 900 [I.V.:900]  Out: -     Safety Concerns:     [address] [employer] Safety Concerns:403191228}    Impairments/Disabilities:      [address] [employer] Impairments/Disabilities:015409764}    Nutrition Therapy:  Current Nutrition Therapy:   [address] [employer] Diet List:531604903}    Routes of Feeding: {CHP DME Other Feedings:630880836}  Liquids: {Slp liquid thickness:27469}  Daily Fluid Restriction: {CHP DME Yes amt example:365287003}  Last Modified Barium Swallow with Video (Video Swallowing Test): {Done Not Done EVI}    Treatments at the Time of Hospital Discharge:   Respiratory Treatments: ***  Oxygen Therapy:  {Therapy; copd oxygen:53949}  Ventilator:    { CC Vent DFNI:403025805}    Rehab Therapies: Physical Therapy, Occupational Therapy and Nurse  Weight Bearing Status/Restrictions: [address] Weight Bearin}  Other Medical Equipment (for information only, NOT a DME order):  {EQUIPMENT:092683889}  Other Treatments: HOME HEALTH CARE: LEVEL 1 STANDARD    Home health agency to establish plan of care for patient over 60 day period   Nursing  Initial home SN evaluation visit to occur within 24-48 hours for:  1)  medication management  2)  VS and clinical assessment  3)  S&S chronic disease exacerbation education +

## 2020-07-15 VITALS
HEIGHT: 67 IN | DIASTOLIC BLOOD PRESSURE: 59 MMHG | OXYGEN SATURATION: 91 % | SYSTOLIC BLOOD PRESSURE: 100 MMHG | RESPIRATION RATE: 16 BRPM | BODY MASS INDEX: 35.79 KG/M2 | HEART RATE: 72 BPM | TEMPERATURE: 98.1 F | WEIGHT: 228 LBS

## 2020-07-15 LAB
ANION GAP SERPL CALCULATED.3IONS-SCNC: 11 MMOL/L (ref 3–16)
BUN BLDV-MCNC: 11 MG/DL (ref 7–20)
CALCIUM SERPL-MCNC: 8.3 MG/DL (ref 8.3–10.6)
CHLORIDE BLD-SCNC: 105 MMOL/L (ref 99–110)
CO2: 24 MMOL/L (ref 21–32)
CREAT SERPL-MCNC: <0.5 MG/DL (ref 0.6–1.2)
GFR AFRICAN AMERICAN: >60
GFR NON-AFRICAN AMERICAN: >60
GLUCOSE BLD-MCNC: 128 MG/DL (ref 70–99)
HCT VFR BLD CALC: 33.5 % (ref 36–48)
HEMOGLOBIN: 11.1 G/DL (ref 12–16)
MCH RBC QN AUTO: 29.2 PG (ref 26–34)
MCHC RBC AUTO-ENTMCNC: 33.1 G/DL (ref 31–36)
MCV RBC AUTO: 88.1 FL (ref 80–100)
PDW BLD-RTO: 13.9 % (ref 12.4–15.4)
PLATELET # BLD: 219 K/UL (ref 135–450)
PMV BLD AUTO: 7.7 FL (ref 5–10.5)
POTASSIUM REFLEX MAGNESIUM: 3.7 MMOL/L (ref 3.5–5.1)
RBC # BLD: 3.8 M/UL (ref 4–5.2)
SODIUM BLD-SCNC: 140 MMOL/L (ref 136–145)
WBC # BLD: 6.7 K/UL (ref 4–11)

## 2020-07-15 PROCEDURE — APPNB30 APP NON BILLABLE TIME 0-30 MINS: Performed by: PHYSICIAN ASSISTANT

## 2020-07-15 PROCEDURE — 97535 SELF CARE MNGMENT TRAINING: CPT

## 2020-07-15 PROCEDURE — 97116 GAIT TRAINING THERAPY: CPT

## 2020-07-15 PROCEDURE — 85027 COMPLETE CBC AUTOMATED: CPT

## 2020-07-15 PROCEDURE — 6360000002 HC RX W HCPCS: Performed by: PHYSICIAN ASSISTANT

## 2020-07-15 PROCEDURE — 80048 BASIC METABOLIC PNL TOTAL CA: CPT

## 2020-07-15 PROCEDURE — 6370000000 HC RX 637 (ALT 250 FOR IP): Performed by: PHYSICIAN ASSISTANT

## 2020-07-15 PROCEDURE — 97110 THERAPEUTIC EXERCISES: CPT

## 2020-07-15 PROCEDURE — 97530 THERAPEUTIC ACTIVITIES: CPT

## 2020-07-15 RX ORDER — KETOROLAC TROMETHAMINE 30 MG/ML
15 INJECTION, SOLUTION INTRAMUSCULAR; INTRAVENOUS EVERY 6 HOURS PRN
Status: DISCONTINUED | OUTPATIENT
Start: 2020-07-15 | End: 2020-07-15 | Stop reason: HOSPADM

## 2020-07-15 RX ORDER — PROMETHAZINE HYDROCHLORIDE 12.5 MG/1
12.5 TABLET ORAL EVERY 6 HOURS PRN
Qty: 30 TABLET | Refills: 0 | Status: SHIPPED | OUTPATIENT
Start: 2020-07-15 | End: 2020-07-22

## 2020-07-15 RX ORDER — MELOXICAM 15 MG/1
15 TABLET ORAL DAILY
Qty: 30 TABLET | Refills: 0 | Status: SHIPPED | OUTPATIENT
Start: 2020-07-15 | End: 2020-10-14

## 2020-07-15 RX ORDER — TRAMADOL HYDROCHLORIDE 50 MG/1
50 TABLET ORAL EVERY 4 HOURS PRN
Qty: 42 TABLET | Refills: 0 | Status: SHIPPED | OUTPATIENT
Start: 2020-07-15 | End: 2020-07-22

## 2020-07-15 RX ORDER — ACETAMINOPHEN 325 MG/1
650 TABLET ORAL EVERY 6 HOURS PRN
Status: DISCONTINUED | OUTPATIENT
Start: 2020-07-15 | End: 2020-07-15 | Stop reason: HOSPADM

## 2020-07-15 RX ORDER — TRAMADOL HYDROCHLORIDE 50 MG/1
100 TABLET ORAL EVERY 6 HOURS PRN
Status: DISCONTINUED | OUTPATIENT
Start: 2020-07-15 | End: 2020-07-15 | Stop reason: HOSPADM

## 2020-07-15 RX ORDER — ACETAMINOPHEN 325 MG/1
650 TABLET ORAL EVERY 6 HOURS PRN
Qty: 120 TABLET | Refills: 0 | COMMUNITY
Start: 2020-07-15 | End: 2022-08-24

## 2020-07-15 RX ORDER — PROMETHAZINE HYDROCHLORIDE 25 MG/1
12.5 TABLET ORAL EVERY 6 HOURS PRN
Status: DISCONTINUED | OUTPATIENT
Start: 2020-07-15 | End: 2020-07-15 | Stop reason: HOSPADM

## 2020-07-15 RX ORDER — TRAMADOL HYDROCHLORIDE 50 MG/1
50 TABLET ORAL EVERY 6 HOURS PRN
Status: DISCONTINUED | OUTPATIENT
Start: 2020-07-15 | End: 2020-07-15 | Stop reason: HOSPADM

## 2020-07-15 RX ADMIN — DOCUSATE SODIUM 50 MG AND SENNOSIDES 8.6 MG 1 TABLET: 8.6; 5 TABLET, FILM COATED ORAL at 08:23

## 2020-07-15 RX ADMIN — ACETAMINOPHEN 650 MG: 325 TABLET ORAL at 05:04

## 2020-07-15 RX ADMIN — GABAPENTIN 300 MG: 300 CAPSULE ORAL at 15:42

## 2020-07-15 RX ADMIN — OXYCODONE 10 MG: 5 TABLET ORAL at 05:04

## 2020-07-15 RX ADMIN — GABAPENTIN 300 MG: 300 CAPSULE ORAL at 08:23

## 2020-07-15 RX ADMIN — CELECOXIB 100 MG: 100 CAPSULE ORAL at 08:23

## 2020-07-15 RX ADMIN — ACETAMINOPHEN 650 MG: 325 TABLET ORAL at 12:01

## 2020-07-15 RX ADMIN — CEFAZOLIN 2 G: 10 INJECTION, POWDER, FOR SOLUTION INTRAVENOUS at 05:20

## 2020-07-15 RX ADMIN — APIXABAN 2.5 MG: 2.5 TABLET, FILM COATED ORAL at 08:23

## 2020-07-15 ASSESSMENT — PAIN SCALES - GENERAL
PAINLEVEL_OUTOF10: 7
PAINLEVEL_OUTOF10: 6
PAINLEVEL_OUTOF10: 3
PAINLEVEL_OUTOF10: 3
PAINLEVEL_OUTOF10: 8

## 2020-07-15 ASSESSMENT — PAIN SCALES - WONG BAKER: WONGBAKER_NUMERICALRESPONSE: 4

## 2020-07-15 ASSESSMENT — PAIN DESCRIPTION - PAIN TYPE: TYPE: SURGICAL PAIN

## 2020-07-15 ASSESSMENT — PAIN DESCRIPTION - ORIENTATION: ORIENTATION: RIGHT

## 2020-07-15 ASSESSMENT — PAIN DESCRIPTION - LOCATION: LOCATION: KNEE

## 2020-07-15 NOTE — PLAN OF CARE
Problem: Falls - Risk of:  Goal: Will remain free from falls  Description: Will remain free from falls  Outcome: Ongoing  Note: Patient encouraged to call out when needing assistance. Bed locked and in the lowest position. Bedside table, pt belongings, along with call light within reach. Verbal understanding from pt.

## 2020-07-15 NOTE — PROGRESS NOTES
Delivered walker to [facility] room.   Thanks for the referral.  Reza Pascual  7/15/2020

## 2020-07-15 NOTE — PROGRESS NOTES
Occupational Therapy  Facility/Department: [place] [address] - MED SURG/ORTHO  Daily Treatment Note/Discharge Summary  NAME: Elvis Barnes  : 1960  MRN: [id number]    Date of Service: 7/15/2020    Discharge Recommendations:  24 hour supervision or assist       Assessment   Performance deficits / Impairments: Decreased functional mobility ; Decreased high-level IADLs;Decreased ADL status; Decreased balance  Assessment: Pt demos good progress with OT, has met all acute OT goals. No further acute OT needs identified, per discussion with Cabrera Ko, OT will dc from acute OT services at this time. Prognosis: Good  OT Education: OT Role;Plan of Care;ADL Adaptive Strategies;Transfer Training;Equipment;IADL Safety; Family Education  REQUIRES OT FOLLOW UP: No  Activity Tolerance  Activity Tolerance: Patient Tolerated treatment well  Safety Devices  Safety Devices in place: Yes  Type of devices: Left in chair;Chair alarm in place;Call light within reach;Nurse notified;Gait belt         Patient Diagnosis(es): The primary encounter diagnosis was Status post total right knee replacement. Diagnoses of S/P total knee arthroplasty, right and Primary osteoarthritis of right knee were also pertinent to this visit. has no past medical history on file. has a past surgical history that includes Appendectomy; Tonsillectomy; and Total knee arthroplasty (Right, 2020).     Restrictions  Restrictions/Precautions  Restrictions/Precautions: General Precautions, Fall Risk, Weight Bearing  Required Braces or Orthoses?: No(per PTA pt adequate quad control)  Lower Extremity Weight Bearing Restrictions  Right Lower Extremity Weight Bearing: Weight Bearing As Tolerated  Required Braces or Orthoses  Right Lower Extremity Brace: Knee Brace  Position Activity Restriction  Other position/activity restrictions: Knee immobilizer - May remove once patient can do a straight leg raise in bed     Subjective   General  Chart Reviewed: Yes  Patient assessed for rehabilitation services?: Yes  Response to previous treatment: Patient with no complaints from previous session  Family / Caregiver Present: Yes(spouse)  Referring Practitioner: MARIO Ortega  Diagnosis: R knee OA, s/p RIGHT TOTAL KNEE REPLACEMENT 7/14/20    Subjective  Subjective: Pt up in chair, pleasant and agreeable to OT treatment. Pain Assessment  Pain Assessment: Faces  Garay-Baker Pain Rating: Hurts little more  Pain Type: Surgical pain  Pain Location: Knee  Pain Orientation: Right  Non-Pharmaceutical Pain Intervention(s): Ambulation/Increased Activity;Repositioned  Pre Treatment Pain Screening  Intervention List: Patient able to continue with treatment  Vital Signs  Patient Currently in Pain: Yes     Orientation  Orientation  Overall Orientation Status: Within Functional Limits     Objective    ADL  Grooming: Supervision(in stance at sink)  UE Dressing: Independent  LE Dressing: Supervision(briefs)     Balance  Sitting Balance: Independent  Standing Balance: Supervision(with SW)  Standing Balance  Activity: functional/bathroom mobility, sink ADLs, LE dressing    Functional Mobility  Functional - Mobility Device: Standard Walker  Activity: To/from bathroom  Assist Level: Supervision    Toilet Transfers  Toilet - Technique: Ambulating  Equipment Used: Standard toilet(with use of R grab bar to simulate home setup)  Toilet Transfer: Supervision     Transfers  Sit to stand: Supervision  Stand to sit: Supervision     Cognition  Overall Cognitive Status: [facility]     Car Transfers  Car Transfers: Pt verbalizes understanding of safe car transfer technique following education.     Plan   Plan  Times per week: 4-6x/week  Current Treatment Recommendations: Strengthening, Balance Training, Functional Mobility Training, Endurance Training, Gait Training, Pain Management, Safety Education & Training, Positioning, Self-Care / ADL, Patient/Caregiver Education & Training, Equipment Evaluation, Education, & procurement, Home Management Training    AM-PAC Score  AM-PAC Inpatient Daily Activity Raw Score: 22 (07/15/20 1137)  AM-PAC Inpatient ADL T-Scale Score : 47.1 (07/15/20 1137)  ADL Inpatient CMS 0-100% Score: 25.8 (07/15/20 1137)  ADL Inpatient CMS G-Code Modifier : Baella Mercy (07/15/20 1137)    Goals  Short term goals  Time Frame for Short term goals: 1 week (by 7/21/20)  Short term goal 1: Pt will complete functional transfers with Supervision or better-- GOAL MET, pt demos Supervision 7/15/20  Short term goal 2: Pt will complete LE dressing with Min A-- GOAL MET, pt demos Supervision 7/15/20  Short term goal 3: Pt will perform 3 minutes dynamic standing activity with SBA-- GOAL MET pt demos Supervision ~ 6 mins 7/15/20  Short term goal 4: Pt will verbalize/demonstrate understanding of a safe car transfer by 7/17-- GOAL MET, pt verbalizes understanding 7/15/20  Patient Goals   Patient goals : \"to walk with (standby) assistance in the house\"-- adequate, pt demos functional mobility with SW at Supervision level 7/15/20       Therapy Time   Individual Concurrent Group Co-treatment   Time In 1103         Time Out 1127         Minutes 24           STORMY Castro/L  This note to serve as discharge summary should pt discharge prior to next session.

## 2020-07-15 NOTE — PROGRESS NOTES
Continue physical therapy and OT, WBAT  4:  Continue Pain Control PRN, adjusted medications. Avoid narcotics if possible d/t adverse side effects  5:  D/c planning for home with SENDY Wood. Possibly tomorrow pending above. DCP updated    Yoandy Felipe PA-C    Addendum:  Pt feeling much improved this afternoon, nausea has resolved and pain controlled. She tolerated the adjusted medications well. Discussed with patient and she wishes to d/c home today.  will dispose of oxycodone properly at home and  new medications through the outpatient pharmacy.       Yoandy Felipe PA-C

## 2020-07-15 NOTE — PROGRESS NOTES
Discharge instructions reviewed with pt. All questions answered. Pt verbalized an understanding. All IV's removed. Pt safely transported down via Wheelchair. All medication delivered at bedside.

## 2020-07-15 NOTE — PROGRESS NOTES
Physical Therapy  Facility/Department: [facility] [address] - MED SURG/ORTHO  Daily Treatment Note  NAME: Bridgett Parker  : 1960  MRN: [id number]    Date of Service: 7/15/2020    Discharge Recommendations:  24 hour supervision or assist, Home with Home health PT   PT Equipment Recommendations  Other: Pt received sw from [facility]    Assessment   Body structures, Functions, Activity limitations: Decreased functional mobility ; Decreased endurance;Decreased ROM; Decreased strength  Assessment: Pt met her Pt goals except bed mobility (N/T)  Reviewed handouts for HEP with pictures. Will benefit from additional Skilled PT to address current deficits. Treatment Diagnosis: decreased mobility post R TKR  Prognosis: Good  Decision Making: Medium Complexity  REQUIRES PT FOLLOW UP: Yes  Activity Tolerance  Activity Tolerance: Patient Tolerated treatment well     Patient Diagnosis(es): The primary encounter diagnosis was Status post total right knee replacement. Diagnoses of S/P total knee arthroplasty, right and Primary osteoarthritis of right knee were also pertinent to this visit. has no past medical history on file. has a past surgical history that includes Appendectomy; Tonsillectomy; and Total knee arthroplasty (Right, 2020). Restrictions  Restrictions/Precautions  Restrictions/Precautions: General Precautions, Fall Risk, Weight Bearing  Required Braces or Orthoses?: No(Demos indep SLRs)  Lower Extremity Weight Bearing Restrictions  Right Lower Extremity Weight Bearing: Weight Bearing As Tolerated  Required Braces or Orthoses  Right Lower Extremity Brace: Knee Brace  Position Activity Restriction  Other position/activity restrictions: Knee immobilizer - May remove once patient can do a straight leg raise in bed  Subjective   General  Chart Reviewed: Yes  Response To Previous Treatment: Patient with no complaints from previous session.   Referring Practitioner: Noe MARTIN for

## 2020-07-16 ENCOUNTER — TELEPHONE (OUTPATIENT)
Dept: ORTHOPEDIC SURGERY | Age: 60
End: 2020-07-16

## 2020-07-16 NOTE — TELEPHONE ENCOUNTER
Spoke with pt, doing well. Incision status: No drainage or redness    Edema/Swelling/Teds: Still swollen today, has been icing and elevating. Pain level and status: Managing well. Use of pain medications: Using tramadol, also using phenergan for nausea. Blood thinner: Eliquis with no issues. Bowels: Taking a stool softener. [facility] Care Agency active: [facility] active with RN and PT.      Outpatient therapy: NA    Do you have all of your medications: Yes    Changes in medications: no    Ortho Vitals: NA    Follow up appointments:    Future Appointments   Date Time Provider Andra Martin   7/31/2020 10:30 AM MD ZACARIAS [facility]

## 2020-07-22 ENCOUNTER — TELEPHONE (OUTPATIENT)
Dept: ORTHOPEDIC SURGERY | Age: 60
End: 2020-07-22

## 2020-07-22 NOTE — TELEPHONE ENCOUNTER
Spoke with pt, doing really. Incision status: No drainage or redness, changed dressing yesterday. Edema/Swelling/Teds: Swelling has gone down, just got an ice machine. Pain level and status: Managing well. Use of pain medications: Using tramadol- and pain is tolerable. Blood thinner: Eliquis with no issues. Bowels: Moving fine. Home Care Agency active: [facility] active with PT and RN. Outpatient therapy: NA    Do you have all of your medications: Yes    Changes in medications: no    Ortho Vitals: NA  Signed off from pt. Instructed pt to call RN or [place] office with any issues or concerns.     Follow up appointments:    Future Appointments   Date Time Provider Andra Martin   7/31/2020 10:30 AM MD ZACARIAS Arrieta AND [facility]

## 2020-07-31 ENCOUNTER — OFFICE VISIT (OUTPATIENT)
Dept: ORTHOPEDIC SURGERY | Age: 60
End: 2020-07-31

## 2020-07-31 VITALS — RESPIRATION RATE: 12 BRPM | BODY MASS INDEX: 35.78 KG/M2 | HEIGHT: 67 IN | WEIGHT: 227.96 LBS

## 2020-07-31 PROCEDURE — 99024 POSTOP FOLLOW-UP VISIT: CPT | Performed by: PHYSICIAN ASSISTANT

## 2020-07-31 NOTE — PLAN OF CARE
[facility] [facility], [address], [address] [place] [address]  Phone: [phone]  Fax [phone]     Physical Therapy Certification    Dear Referring Practitioner: MARIO Chase,    We had the pleasure of evaluating the following patient for physical therapy services at [address] [place] [address]. A summary of our findings can be found in the initial assessment below. This includes our plan of care. If you have any questions or concerns regarding these findings, please do not hesitate to contact me at the office phone number checked above. Thank you for the referral.       Physician Signature:_______________________________Date:__________________  By signing above (or electronic signature), therapists plan is approved by physician    Patient: Delio Alonzo   : 1960   MRN: [id number]  Referring Physician: Referring Practitioner: MARIO Chase      Evaluation Date: 2020      Medical Diagnosis Information:  Diagnosis: V40.402 (ICD-10-CM) - Status post total knee replacement, right   Treatment Diagnosis: Right Knee Pain (M25.561); Right Knee Effusion (M25.461); Right Kneee Stiffness (M25.661); Right Lower Extremity Weakness (M62.81)                                         Insurance information: PT Insurance Information: BCBS       Precautions/ Contra-indications: WBAT  Latex Allergy:  [x]NO      []YES  Preferred Language for Healthcare:   [x]English       []other:    SUBJECTIVE/HISTORY: Patient is a 63 y.o. female s/p right TKA on 20. She works as a . She is off work at present and will return to work on 20. Her primary physical activity is walking 30-minutes during her lunch break, but she notes she was not gregory to do this for three months prior to surgery. She has not had any fevers, chills, or night sweats since surgery.     Relevant Medical History:Osteoarthritis  Functional Disability Index (LEFS): 63.75%disability (29/80)    Height: 5'7\" Weight: 227lbs  Pain Scale: 3-4/10  Easing factors: rest  Provocative factors: Sleeping, Standing, walking, squatting, stairs, kneeling     Type: []Constant   [x]Intermittent  []Radiating []Localized []other:     Numbness/Tingling: None    Occupation/School: .    Living Status/Prior Level of Function: Independent with ADLs and IADLs    OBJECTIVE:     Functional Mobility:    Gait: Ambulating with SPC on left; No loading response on right; inadequate knee extension in midstance and terminal stance on right; decreased weightbearing on right, shortened stride length bilaterally. ROM LEFT RIGHT   Knee ext 0 -10   Knee Flex 131 83          Moderate right LE swelling with pitting edema in right leg. Reflexes/Sensation: L2-S2 myotomes intact and symmetrical to light touch. [x]Dermatomes/Myotomes intact    []Reflexes equal and normal bilaterally   []Other:    Joint mobility:    []Normal    [x]Hypo   []Hyper    Palpation: TTP of right quadriceps, hamstrings, and calf. Bandages/Dressings/Incisions: clean and healing well with no signs of infection. [x] Patient history, allergies, meds reviewed. Medical chart reviewed. See intake form. Review Of Systems (ROS):  [x]Performed Review of systems (Integumentary, CardioPulmonary, Neurological) by intake and observation. Intake form has been scanned into medical record. Patient has been instructed to contact their primary care physician regarding ROS issues if not already being addressed at this time.       Co-morbidities/Complexities (which will affect course of rehabilitation):   []None           Arthritic conditions   []Rheumatoid arthritis (M05.9)  [x]Osteoarthritis (M19.91)   Cardiovascular conditions   []Hypertension (I10)  []Hyperlipidemia (E78.5)  []Angina pectoris (I20)  []Atherosclerosis (I70)   Musculoskeletal conditions   []Disc pathology   []Congenital spine pathologies []Prior surgical intervention  []Osteoporosis (M81.8)  []Osteopenia (M85.8)   Endocrine conditions   []Hypothyroid (E03.9)  []Hyperthyroid Gastrointestinal conditions   []Constipation (V81.92)   Metabolic conditions   []Morbid obesity (E66.01)  []Diabetes type 1(E10.65) or 2 (E11.65)   []Neuropathy (G60.9)     Pulmonary conditions   []Asthma (J45)  []Coughing   []COPD (J44.9)   Psychological Disorders  []Anxiety (F41.9)  []Depression (F32.9)   []Other:   []Other:          Barriers to/and or personal factors that will affect rehab potential:              [x]Age  []Sex              []Motivation/Lack of Motivation                        []Co-Morbidities              []Cognitive Function, education/learning barriers              []Environmental, home barriers              []profession/work barriers  []past PT/medical experience  []other:  Justification: Age may slow healing timeline. Falls Risk Assessment (30 days): Patient reports not history of falls. [] Falls Risk assessed and no intervention required. [] Falls Risk assessed and Patient requires intervention due to being higher risk   TUG score (>12s at risk):     [] Falls education provided, including         ASSESSMENT: Patient is a 63 y.o. female s/p right TKA on 71/14/20. She requires skilled PT intervention in order to improve right knee ROM, decrease right knee swelling, increase right LE muscular endurance, and increase right LE strength in order to return to her prior level of function and activity without the onset of right knee pain.     Functional Impairments:     [x]Noted lumbar/proximal hip/LE joint hypomobility   [x]Decreased LE functional ROM   [x]Decreased core/proximal hip strength and neuromuscular control   [x]Decreased LE functional strength   [x]Reduced balance/proprioceptive control   []other:      Functional Activity Limitations (from functional questionnaire and intake)   [x]Reduced ability to tolerate prolonged functional positions   [x]Reduced ability or difficulty with changes of positions or transfers between positions   []Reduced ability to maintain good posture and demonstrate good body mechanics with sitting, bending, and lifting   [x]Reduced ability to sleep   [x] Reduced ability or tolerance with driving and/or computer work   [x]Reduced ability to perform lifting, carrying tasks   [x]Reduced ability to squat   []Reduced ability to forward bend   [x]Reduced ability to ambulate prolonged functional periods/distances/surfaces   [x]Reduced ability to ascend/descend stairs   [x]Reduced ability to run, hop, cut or jump   []other:    Participation Restrictions   [x]Reduced participation in self care activities   [x]Reduced participation in home management activities   []Reduced participation in work activities   []Reduced participation in social activities. []Reduced participation in sport/recreation activities. Classification :    [x]Signs/symptoms consistent with post-surgical status including decreased ROM, strength and function.    []Signs/symptoms consistent with joint sprain/strain   []Signs/symptoms consistent with patella-femoral syndrome   []Signs/symptoms consistent with knee OA/hip OA   []Signs/symptoms consistent with internal derangement of knee/Hip   []Signs/symptoms consistent with functional hip weakness/NMR control      []Signs/symptoms consistent with tendinitis/tendinosis    []signs/symptoms consistent with pathology which may benefit from Dry needling      []other:      Prognosis/Rehab Potential:      []Excellent   [x]Good    []Fair   []Poor    Tolerance of evaluation/treatment:    []Excellent   [x]Good    []Fair   []Poor  Physical Therapy Evaluation Complexity Justification  [x] A history of present problem with:  [] no personal factors and/or comorbidities that impact the plan of care;  [x]1-2 personal factors and/or comorbidities that impact the plan of care  []3 personal factors and/or comorbidities that Met: [] Adjusted  2. Patient will have a decrease in pain to facilitate improvement in movement, function, and ADLs as indicated by Functional Deficits. [] Progressing: [] Met: [] Not Met: [] Adjusted    Long Term Goals: To be achieved in: 12 weeks  1. Disability index score of 30% or less for the LEFS to assist with reaching prior level of function. [] Progressing: [] Met: [] Not Met: [] Adjusted  2. Patient will demonstrate ROM on affected side equal to the unaffected side to allow for proper joint functioning as indicated by patients Functional Deficits. [] Progressing: [] Met: [] Not Met: [] Adjusted  3. Patient will demonstrate an increase in Strength to good proximal hip strength and control, within 5lb HHD in LE to allow for proper functional mobility as indicated by patients Functional Deficits. [] Progressing: [] Met: [] Not Met: [] Adjusted  4. Patient will return to ascend/descend stairs reciprocallyy without increased symptoms or restriction. [] Progressing: [] Met: [] Not Met: [] Adjusted  5. Patient will be able to perform a sit-to-stand without UE assist with no onset of right knee pain.    [] Progressing: [] Met: [] Not Met: [] Adjusted     Electronically signed by:  Nate Gonzalez PT

## 2020-07-31 NOTE — FLOWSHEET NOTE
[facility] [facility],  [address]  Phone: [phone]  Fax [phone]    Physical Therapy Treatment Note/ Progress Report:           Date:  2020    Patient Name:  Alex Reynolds    :  1960  MRN: [id number]  Restrictions/Precautions:    Medical/Treatment Diagnosis Information:  · Diagnosis: C62.977 (ICD-10-CM) - Status post total knee replacement, right  · Treatment Diagnosis: Right Knee Pain (M25.561); Right Knee Effusion (M25.461); Right Kneee Stiffness (M25.661); Right Lower Extremity Weakness (C55.39)  Insurance/Certification information:  PT Insurance Information: Haylie Aguilar 150  Physician Information:  Referring Practitioner: MARIO Little  Has the plan of care been signed (Y/N):        []  Yes  [x]  No     Date of Patient follow up with Physician: TBD      Is this a Progress Report:     []  Yes  [x]  No        If Yes:  Date Range for reporting period:  Beginnin/3/20  Ending:    Progress report will be due (10 Rx or 30 days whichever is less): 86       Recertification will be due (POC Duration  / 90 days whichever is less): 10/26/20     Orthovitals Schedule:  · 4-Week: 20  · 8 Week: 20  · 12 Week: 10/6/20      Visit # Insurance Allowable Requires auth   1 %0CP-20PT    []no        [x]yes:     Functional Scale (LEFS):  · 8/3/20: % Disability (/80)        Latex Allergy:  [x]NO      []YES  Preferred Language for Healthcare:   [x]English       []other:      Pain level:  3-4/10     SUBJECTIVE:  See eval    OBJECTIVE: See eval   Observation:    Test measurements:      RESTRICTIONS/PRECAUTIONS: WBAT    Exercises/Interventions:     Therapeutic Ex (71287) Volume Notes/CUES   Quadsets 6 min, 3\" on/ 3\" off    HeelSlide with Strap Assist 6 min 1 sec quadset when knee is extended.                                                      Manual Intervention (43031) 14    MLD to right thigh and leg     STM to right quadriceps and hamstring                         NMR re-education (52321)  CUES NEEDED                                                Therapeutic Activity (37337)                                   Additional time was spent explaining exercise instruction, exercise set up, and rest breaks. Patient Education: Patient educated on examination findings, plan of care, and home exercise program. Patient received verbal instructions to immediately inform their attending clinician if they endured increased discomfort or perceived any component of their therapy to be counterproductive, so that their therapeutic parameters could be modified accordingly. Patient acknowledged these instructions and agreed to comply. Access Code: [email or web address]   URL: [employer]/   Date: 08/03/2020   Prepared by: Juliana Ramirez     Exercises  Long Sitting Quad Set - 1 sets - 3 hold - 6 Time - 3x daily - 7x weekly  Sitting Heel Slide with Towel - 1 sets - 1 sec hold - 6 minutes Time - 3x daily - 7x weekly    Therapeutic Exercise and NMR EXR  [x] (13138) Provided verbal/tactile cueing for activities related to strengthening, flexibility, endurance, ROM for improvements in LE, proximal hip, and core control with self care, mobility, lifting, ambulation.  [] (31427) Provided verbal/tactile cueing for activities related to improving balance, coordination, kinesthetic sense, posture, motor skill, proprioception  to assist with LE, proximal hip, and core control in self care, mobility, lifting, ambulation and eccentric single leg control.      NMR and Therapeutic Activities:    [] (15293 or 63390) Provided verbal/tactile cueing for activities related to improving balance, coordination, kinesthetic sense, posture, motor skill, proprioception and motor activation to allow for proper function of core, proximal hip and LE with self care and ADLs  [] (08694) Gait Re-education- Provided training and instruction to the patient for proper LE, core and To be achieved in: 2 weeks  1. Independent in HEP and progression per patient tolerance, in order to prevent re-injury. [] Progressing: [] Met: [] Not Met: [] Adjusted  2. Patient will have a decrease in pain to facilitate improvement in movement, function, and ADLs as indicated by Functional Deficits. [] Progressing: [] Met: [] Not Met: [] Adjusted    Long Term Goals: To be achieved in: 12 weeks  1. Disability index score of 30% or less for the LEFS to assist with reaching prior level of function. [] Progressing: [] Met: [] Not Met: [] Adjusted  2. Patient will demonstrate ROM on affected side equal to the unaffected side to allow for proper joint functioning as indicated by patients Functional Deficits. [] Progressing: [] Met: [] Not Met: [] Adjusted  3. Patient will demonstrate an increase in Strength to good proximal hip strength and control, within 5lb HHD in LE to allow for proper functional mobility as indicated by patients Functional Deficits. [] Progressing: [] Met: [] Not Met: [] Adjusted  4. Patient will return to ascend/descend stairs reciprocallyy without increased symptoms or restriction. [] Progressing: [] Met: [] Not Met: [] Adjusted  5. Patient will be able to perform a sit-to-stand without UE assist with no onset of right knee pain. [] Progressing: [] Met: [] Not Met: [] Adjusted    Progression Towards Functional goals:  [] Patient is progressing as expected towards functional goals listed. [] Progression is slowed due to complexities listed. [] Progression has been slowed due to co-morbidities. [x] Plan just implemented, too soon to assess goals progression  [] Other:         Overall Progression Towards Functional goals/ Treatment Progress Update:  [] Patient is progressing as expected towards functional goals listed. [] Progression is slowed due to complexities/Impairments listed. [] Progression has been slowed due to co-morbidities.   [x] Plan just implemented, too soon to assess goals progression <30days   [] Goals require adjustment due to lack of progress  [] Patient is not progressing as expected and requires additional follow up with physician  [] Other    Prognosis for POC: [x] Good [] Fair  [] Poor      Patient requires continued skilled intervention: [x] Yes  [] No    Treatment/Activity Tolerance:  [x] Patient able to complete treatment  [] Patient limited by fatigue  [] Patient limited by pain    [] Patient limited by other medical complications  [] Other:     ASSESSMENT: Patient is a 63 y.o. female s/p right TKA on 71/14/20. She requires skilled PT intervention in order to improve right knee ROM, decrease right knee swelling, increase right LE muscular endurance, and increase right LE strength in order to return to her prior level of function and activity without the onset of right knee pain. Return to Play: (if applicable)   []  Stage 1: Intro to Strength   []  Stage 2: Return to Run and Strength   []  Stage 3: Return to Jump and Strength   []  Stage 4: Dynamic Strength and Agility   []  Stage 5: Sport Specific Training     []  Ready to Return to Play, Meets All Above Stages   []  Not Ready for Return to Sports   Comments:                               PLAN: 1-2x per week for 12 weeks (beginning 8/3/20, ending 10/26/20)  [] Continue per plan of care [] Alter current plan (see comments above)  [x] Plan of care initiated [] Hold pending MD visit [] Discharge      Electronically signed by:  Nate Gonzalez PT    Note: If patient does not return for scheduled/ recommended follow up visits, this note will serve as a discharge from care along with most recent update on progress.

## 2020-07-31 NOTE — PROGRESS NOTES
Chief Complaint   Patient presents with    Post-Op Check     1ST PO CHECK RIGHT TKR     History of present illness: The patient returns today after right total knee replacement performed on 7/14/2020. Pain control has been satisfactory with oral medications. There have been no fevers or chills. She is doing very well postoperatively. She is taking pain medication very sparingly. She is working with home physical therapy. Physical examination: The incision is clean, dry, and intact with no drainage or signs of infection. Range of motion is 0 degrees of extension to 5 degrees of flexion. There is expected swelling with no evidence of DVT and a negative Homans sign. Neurovascular exam is intact. Radiographs: X-rays taken today including AP, lateral, and skyline views of the right knee show excellent alignment of the prosthesis. No evidence of septic or aseptic loosening or periprosthetic fractures. Assessment/plan: We would like to begin outpatient physical therapy to restore range of motion and strength. The patient was given local wound care instructions. We discussed dental prophylaxis for 1 year postoperatively. We will followup in 3-4 weeks for reevaluation. She plans to return to work on 8/31/2020. 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 Dania Colon, [facility]    This dictation was performed with a verbal recognition program [facility]) and it was checked for errors. It is possible that there are still dictated errors within this office note. If so, please bring any errors to my attention for an addendum. All efforts were made to ensure that this office note is accurate.

## 2020-08-03 ENCOUNTER — HOSPITAL ENCOUNTER (OUTPATIENT)
Dept: PHYSICAL THERAPY | Age: 60
Setting detail: THERAPIES SERIES
Discharge: HOME | End: 2020-08-03
Payer: COMMERCIAL

## 2020-08-03 PROCEDURE — 97161 PT EVAL LOW COMPLEX 20 MIN: CPT

## 2020-08-03 PROCEDURE — 97110 THERAPEUTIC EXERCISES: CPT

## 2020-08-03 PROCEDURE — 97140 MANUAL THERAPY 1/> REGIONS: CPT

## 2020-08-03 PROCEDURE — 97016 VASOPNEUMATIC DEVICE THERAPY: CPT

## 2020-08-06 ENCOUNTER — HOSPITAL ENCOUNTER (OUTPATIENT)
Dept: PHYSICAL THERAPY | Age: 60
Setting detail: THERAPIES SERIES
Discharge: HOME | End: 2020-08-06
Payer: COMMERCIAL

## 2020-08-06 PROCEDURE — 97110 THERAPEUTIC EXERCISES: CPT

## 2020-08-06 PROCEDURE — 97140 MANUAL THERAPY 1/> REGIONS: CPT

## 2020-08-06 NOTE — FLOWSHEET NOTE
[facility],  [address]  Phone: [phone]  Fax [phone]    Physical Therapy Treatment Note/ Progress Report:           Date:  2020    Patient Name:  Hughie Gottron    :  1960  MRN: [id number]  Restrictions/Precautions:    Medical/Treatment Diagnosis Information:  · Diagnosis: B92.149 (ICD-10-CM) - Status post total knee replacement, right  · Treatment Diagnosis: Right Knee Pain (M25.561); Right Knee Effusion (M25.461); Right Kneee Stiffness (M25.661); Right Lower Extremity Weakness (F86.38)  Insurance/Certification information:  PT Insurance Information: Haylie Aguilar 150  Physician Information:  Referring Practitioner: MARIO Singh  Has the plan of care been signed (Y/N):        []  Yes  [x]  No     Date of Patient follow up with Physician: TERELLD      Is this a Progress Report:     []  Yes  [x]  No        If Yes:  Date Range for reporting period:  Beginnin/3/20  Ending:    Progress report will be due (10 Rx or 30 days whichever is less): 23       Recertification will be due (POC Duration  / 90 days whichever is less): 10/26/20     Orthovitals Schedule:  · 4-Week: 20  · 8 Week: 20  · 12 Week: 10/6/20      Visit # Insurance Allowable Requires auth   2 %0CP-20PT    []no        [x]yes:     Functional Scale (LEFS):  · 8/3/20: % Disability (/80)        Latex Allergy:  [x]NO      []YES  Preferred Language for Healthcare:   [x]English       []other:      Pain level:  5/10     SUBJECTIVE:  See Patient notes increased pain in her thigh and calf upon waking today. She notes she may have been up on her feet a little more yesterday but she is not certain. Her HEP is going well with no adverse effects. OBJECTIVE:   Right Knee AROM (post-tx):     No increased warmth, firmness, or redness in right calf. No increased warmth or firmness in right thigh.  Her clithing prevented PT from evaluating thigh color. The patient reported she had not observed any increased redness in her right thigh. RESTRICTIONS/PRECAUTIONS: WBAT    Exercises/Interventions:     Therapeutic Ex (85796) Volume Notes/CUES   Quadsets 6 min, 3\" on/ 3\" off, 7.5# on thigh    HeelSlide with Strap Assist 6 min 1 sec quadset when knee is extended. Leg Press 6 min, 30#                                                 Manual Intervention (25563) 27    MLD to right thigh and leg     STM to right quadriceps, hamstring, and calf     Grade III-IV Inferior and superior glides of the right patella     Grade III-IV extension mobilizations of the right knee               NMR re-education (69289)  CUES NEEDED                                                Therapeutic Activity (94718)                                   Additional time was spent explaining exercise instruction, exercise set up, and rest breaks. Patient Education: Continue current HEP  Access Code: [email or web address]   URL: [email or web address]   Date: 08/03/2020   Prepared by: Shannan Solomon     Exercises  Long Sitting Quad Set - 1 sets - 3 hold - 6 Time - 3x daily - 7x weekly  Sitting Heel Slide with Towel - 1 sets - 1 sec hold - 6 minutes Time - 3x daily - 7x weekly    Therapeutic Exercise and NMR EXR  [x] (50142) Provided verbal/tactile cueing for activities related to strengthening, flexibility, endurance, ROM for improvements in LE, proximal hip, and core control with self care, mobility, lifting, ambulation.  [] (70458) Provided verbal/tactile cueing for activities related to improving balance, coordination, kinesthetic sense, posture, motor skill, proprioception  to assist with LE, proximal hip, and core control in self care, mobility, lifting, ambulation and eccentric single leg control.      NMR and Therapeutic Activities:    [] (78077 or 95255) Provided verbal/tactile cueing for activities related to improving balance, coordination, kinesthetic sense, posture, motor skill, GOALS:  Patient stated goal: Patient will be able to walk 30 minutes without restriction or onset or right knee pain. [x] Progressing: [] Met: [] Not Met: [] Adjusted    Therapist goals for Patient:   Short Term Goals: To be achieved in: 2 weeks  1. Independent in HEP and progression per patient tolerance, in order to prevent re-injury. [x] Progressing: [] Met: [] Not Met: [] Adjusted  2. Patient will have a decrease in pain to facilitate improvement in movement, function, and ADLs as indicated by Functional Deficits. [x] Progressing: [] Met: [] Not Met: [] Adjusted    Long Term Goals: To be achieved in: 12 weeks  1. Disability index score of 30% or less for the LEFS to assist with reaching prior level of function. [x] Progressing: [] Met: [] Not Met: [] Adjusted  2. Patient will demonstrate ROM on affected side equal to the unaffected side to allow for proper joint functioning as indicated by patients Functional Deficits. [x] Progressing: [] Met: [] Not Met: [] Adjusted  3. Patient will demonstrate an increase in Strength to good proximal hip strength and control, within 5lb HHD in LE to allow for proper functional mobility as indicated by patients Functional Deficits. [x] Progressing: [] Met: [] Not Met: [] Adjusted  4. Patient will return to ascend/descend stairs reciprocallyy without increased symptoms or restriction. [x] Progressing: [] Met: [] Not Met: [] Adjusted  5. Patient will be able to perform a sit-to-stand without UE assist with no onset of right knee pain. [x] Progressing: [] Met: [] Not Met: [] Adjusted    Progression Towards Functional goals:  [x] Patient is progressing as expected towards functional goals listed. [] Progression is slowed due to complexities listed. [] Progression has been slowed due to co-morbidities.   [] Plan just implemented, too soon to assess goals progression  [] Other:         Overall Progression Towards Functional goals/ Treatment Progress Update:  [x] Patient is progressing as expected towards functional goals listed. [] Progression is slowed due to complexities/Impairments listed. [] Progression has been slowed due to co-morbidities. [] Plan just implemented, too soon to assess goals progression <30days   [] Goals require adjustment due to lack of progress  [] Patient is not progressing as expected and requires additional follow up with physician  [] Other    Prognosis for POC: [x] Good [] Fair  [] Poor      Patient requires continued skilled intervention: [x] Yes  [] No    Treatment/Activity Tolerance:  [x] Patient able to complete treatment  [] Patient limited by fatigue  [] Patient limited by pain    [] Patient limited by other medical complications  [] Other:     ASSESSMENT: Patient tolerated treatment well. She requires skilled PT intervention in order to improve right knee ROM, decrease right knee swelling, increase right LE muscular endurance, and increase right LE strength in order to return to her prior level of function and activity without the onset of right knee pain. Return to Play: (if applicable)   []  Stage 1: Intro to Strength   []  Stage 2: Return to Run and Strength   []  Stage 3: Return to Jump and Strength   []  Stage 4: Dynamic Strength and Agility   []  Stage 5: Sport Specific Training     []  Ready to Return to Play, Meets All Above Stages   []  Not Ready for Return to Sports   Comments:                               PLAN: 1-2x per week for 12 weeks (beginning 8/3/20, ending 10/26/20)  [x] Continue per plan of care [] Alter current plan (see comments above)  [] Plan of care initiated [] Hold pending MD visit [] Discharge      Electronically signed by:  Tano Tejada PT    Note: If patient does not return for scheduled/ recommended follow up visits, this note will serve as a discharge from care along with most recent update on progress.

## 2020-08-10 ENCOUNTER — HOSPITAL ENCOUNTER (OUTPATIENT)
Dept: PHYSICAL THERAPY | Age: 60
Setting detail: THERAPIES SERIES
Discharge: HOME | End: 2020-08-10
Payer: COMMERCIAL

## 2020-08-10 PROCEDURE — 97110 THERAPEUTIC EXERCISES: CPT

## 2020-08-10 PROCEDURE — 97140 MANUAL THERAPY 1/> REGIONS: CPT

## 2020-08-10 NOTE — FLOWSHEET NOTE
[facility], [address]  Phone: [phone]  Fax [phone]    Physical Therapy Treatment Note/ Progress Report:           Date:  8/10/2020    Patient Name:  Maurice Mclaughlin    :  1960  MRN: [id number]  Restrictions/Precautions:    Medical/Treatment Diagnosis Information:  · Diagnosis: P94.431 (ICD-10-CM) - Status post total knee replacement, right  · Treatment Diagnosis: Right Knee Pain (M25.561); Right Knee Effusion (M25.461); Right Kneee Stiffness (M25.661); Right Lower Extremity Weakness (Z24.34)  Insurance/Certification information:  PT Insurance Information: dAán Ordoñez  Physician Information:  Referring Practitioner: MARIO Martinez  Has the plan of care been signed (Y/N):        []  Yes  [x]  No     Date of Patient follow up with Physician: TBD      Is this a Progress Report:     []  Yes  [x]  No        If Yes:  Date Range for reporting period:  Beginnin/3/20  Ending:    Progress report will be due (10 Rx or 30 days whichever is less): 06       Recertification will be due (POC Duration  / 90 days whichever is less): 10/26/20     Orthovitals Schedule:  · 4-Week: 20  · 8 Week: 20  · 12 Week: 10/6/20      Visit # Insurance Allowable Requires auth   3 %0CP-20PT    []no        [x]yes:     Functional Scale (LEFS):  · 8/3/20: % Disability (/80)        Latex Allergy:  [x]NO      []YES  Preferred Language for Healthcare:   [x]English       []other:      Pain level:  2/10     SUBJECTIVE:  Patient notes she is feeling pretty good today. States her knee feels less swollen. OBJECTIVE:   Right Knee extension AROM:  -8deg   Right knee flexion AROM (Observation): 90deg   No increased warmth, firmness, or redness in right calf.     RESTRICTIONS/PRECAUTIONS: WBAT    Exercises/Interventions:     Therapeutic Ex (28827) Volume Notes/CUES   Quadsets 6 min, 3\" on/ 3\" off, 7.5# on thigh    Leg Press 6 min, 30#    LAQ 12x on right    SLR 12x on right    Sidelying Abduction 12x on right    Seated [place] Stretch 30\" on right    Standing Gastroc Stretch 30\" on right                        Manual Intervention (45646) 17    MLD to right thigh and leg     STM to right quadriceps, hamstring, and calf     Grade III-IV extension mobilizations of the right knee                    NMR re-education (87922)  CUES NEEDED                                                Therapeutic Activity (24500)                                   Additional time was spent explaining exercise instruction, exercise set up, and rest breaks. Patient Education: Updated HEP; provided new handout. Access Code: [email or web address]   URL: [employer]/   Date: 08/10/2020   Prepared by: Jennifer Childs     Exercises  Long Sitting Quad Set - 1 sets - 3 hold - 6 Time - 3x daily - 7x weekly  Sitting Heel Slide with Towel - 1 sets - 1 sec hold - 6 minutes Time - 3x daily - 7x weekly  Seated Long Arc Quad - 12 reps - 3 sets - 1x daily - 7x weekly  Supine Straight Leg Raises - 12 reps - 3 sets - 1x daily - 7x weekly                   Sidelying Hip Abduction - 12 reps - 3 sets - 1x daily - 7x weekly  Seated Table Hamstring Stretch - 3 reps - 1 sets - 30 sec Hold - 3x daily - 7x weekly  Gastroc Stretch on Wall - 3 reps - 1 sets - 30 sec hold - 3x daily - 7x weekly    Therapeutic Exercise and NMR EXR  [x] (86116) Provided verbal/tactile cueing for activities related to strengthening, flexibility, endurance, ROM for improvements in LE, proximal hip, and core control with self care, mobility, lifting, ambulation.  [] (11198) Provided verbal/tactile cueing for activities related to improving balance, coordination, kinesthetic sense, posture, motor skill, proprioception  to assist with LE, proximal hip, and core control in self care, mobility, lifting, ambulation and eccentric single leg control.      NMR and Therapeutic Activities:    [] (95240 or 97597) Provided verbal/tactile cueing for activities related to improving balance, coordination, kinesthetic sense, posture, motor skill, proprioception and motor activation to allow for proper function of core, proximal hip and LE with self care and ADLs  [] (30688) Gait Re-education- Provided training and instruction to the patient for proper LE, core and proximal hip recruitment and positioning and eccentric body weight control with ambulation re-education including up and down stairs     Home Exercise Program:    [x] (59365) Reviewed/Progressed HEP activities related to strengthening, flexibility, endurance, ROM of core, proximal hip and LE for functional self-care, mobility, lifting and ambulation/stair navigation   [] (85649)Reviewed/Progressed HEP activities related to improving balance, coordination, kinesthetic sense, posture, motor skill, proprioception of core, proximal hip and LE for self care, mobility, lifting, and ambulation/stair navigation      Manual Treatments:  PROM / STM / Oscillations-Mobs:  G-I, II, III, IV (PA's, Inf., Post.)  [x] (99830) Provided manual therapy to mobilize LE, proximal hip and/or LS spine soft tissue/joints for the purpose of modulating pain, promoting relaxation,  increasing ROM, reducing/eliminating soft tissue swelling/inflammation/restriction, improving soft tissue extensibility and allowing for proper ROM for normal function with self care, mobility, lifting and ambulation. Modalities:     [] GAME READY (VASO)- for significant edema, swelling, pain control.   - 15 minutes    Charges:  Timed Code Treatment Minutes: 42   Total Treatment Minutes: 42     Time In: 12:45pm  Time Out: 1:27pm      [] EVAL (LOW) 47061 (typically 20 minutes face-to-face)  [] EVAL (MOD) 20558 (typically 30 minutes face-to-face)  [] EVAL (HIGH) 47828 (typically 45 minutes face-to-face)  [] RE-EVAL     [x] LY(70317) x 2    [] IONTO  [] NMR (39797) x     [x] VASO-GameReady for 15 minutes on Low, max cold   [x] Manual (37712) x 1     [] Other:  [] TA x      [] Mech Traction (87941)  [] ES(attended) (20831)      [] ES (un) (60675):       GOALS:  Patient stated goal: Patient will be able to walk 30 minutes without restriction or onset or right knee pain. [x] Progressing: [] Met: [] Not Met: [] Adjusted    Therapist goals for Patient:   Short Term Goals: To be achieved in: 2 weeks  1. Independent in HEP and progression per patient tolerance, in order to prevent re-injury. [x] Progressing: [] Met: [] Not Met: [] Adjusted  2. Patient will have a decrease in pain to facilitate improvement in movement, function, and ADLs as indicated by Functional Deficits. [x] Progressing: [] Met: [] Not Met: [] Adjusted    Long Term Goals: To be achieved in: 12 weeks  1. Disability index score of 30% or less for the LEFS to assist with reaching prior level of function. [x] Progressing: [] Met: [] Not Met: [] Adjusted  2. Patient will demonstrate ROM on affected side equal to the unaffected side to allow for proper joint functioning as indicated by patients Functional Deficits. [x] Progressing: [] Met: [] Not Met: [] Adjusted  3. Patient will demonstrate an increase in Strength to good proximal hip strength and control, within 5lb HHD in LE to allow for proper functional mobility as indicated by patients Functional Deficits. [x] Progressing: [] Met: [] Not Met: [] Adjusted  4. Patient will return to ascend/descend stairs reciprocallyy without increased symptoms or restriction. [x] Progressing: [] Met: [] Not Met: [] Adjusted  5. Patient will be able to perform a sit-to-stand without UE assist with no onset of right knee pain. [x] Progressing: [] Met: [] Not Met: [] Adjusted    Progression Towards Functional goals:  [x] Patient is progressing as expected towards functional goals listed. [] Progression is slowed due to complexities listed. [] Progression has been slowed due to co-morbidities.   [] Plan just implemented, too soon

## 2020-08-13 ENCOUNTER — HOSPITAL ENCOUNTER (OUTPATIENT)
Dept: PHYSICAL THERAPY | Age: 60
Setting detail: THERAPIES SERIES
Discharge: HOME | End: 2020-08-13
Payer: COMMERCIAL

## 2020-08-13 PROCEDURE — 97110 THERAPEUTIC EXERCISES: CPT

## 2020-08-13 PROCEDURE — 97140 MANUAL THERAPY 1/> REGIONS: CPT

## 2020-08-13 NOTE — FLOWSHEET NOTE
[facility],  [address]  Phone: [phone]  Fax [phone]    Physical Therapy Treatment Note/ Progress Report:           Date:  2020    Patient Name:  Althea Soulier    :  1960  MRN: [id number]  Restrictions/Precautions:    Medical/Treatment Diagnosis Information:  · Diagnosis: J86.433 (ICD-10-CM) - Status post total knee replacement, right  · Treatment Diagnosis: Right Knee Pain (M25.561); Right Knee Effusion (M25.461); Right Kneee Stiffness (M25.661); Right Lower Extremity Weakness (N76.18)  Insurance/Certification information:  PT Insurance Information: Haylie Aguilar 150  Physician Information:  Referring Practitioner: MARIO Littlejohn  Has the plan of care been signed (Y/N):        []  Yes  [x]  No     Date of Patient follow up with Physician: TBD      Is this a Progress Report:     []  Yes  [x]  No        If Yes:  Date Range for reporting period:  Beginnin/3/20  Ending:    Progress report will be due (10 Rx or 30 days whichever is less): 48       Recertification will be due (POC Duration  / 90 days whichever is less): 10/26/20     Orthovitals Schedule (No prehab numbers available on orthovitals):  · 4-Week: 20 (Will be performed at next visit on 20  · 8 Week: 20  · 12 Week: 10/6/20      Visit # Insurance Allowable Requires auth   4 [id number]    []no        [x]yes:     Functional Scale (LEFS):  · 8/3/20: 63.75% Disability (29/80)        Latex Allergy:  [x]NO      []YES  Preferred Language for Healthcare:   [x]English       []other:      Pain level:  2/10     SUBJECTIVE:  Patient notes minimal pain. Primary complaint ins her knee feels \"tight. \"    OBJECTIVE:   Right Knee AROM: -- //-4-   No increased warmth, firmness, or redness in right calf.     RESTRICTIONS/PRECAUTIONS: WBAT    Exercises/Interventions:     Therapeutic Ex (73983) Volume Notes/CUES   Quadsets 8 min, 3\" on/ 3\" off, 7.5# on thigh    Recumbent Bike 8 min, oscillating    Leg Press 5 min, 40#                                                 Manual Intervention (36970) 29    MLD to right thigh and leg     STM to right quadriceps, hamstring, and calf     Manual stretching for knee flexion and knee extension. 5\" holds each, alternating. NMR re-education (11892)  CUES NEEDED                                                Therapeutic Activity (53762)                                   Additional time was spent explaining exercise instruction, exercise set up, and rest breaks. Patient Education: add two more rounds of heelslides per day for five total.  Access Code: [email or web address]   URL: [email or web address]   Date: 08/10/2020   Prepared by: Monica Shore     Exercises  Long Sitting Quad Set - 1 sets - 3 hold - 6 Time - 3x daily - 7x weekly  Sitting Heel Slide with Towel - 1 sets - 1 sec hold - 6 minutes Time - 3x daily - 7x weekly  Seated Long Arc Quad - 12 reps - 3 sets - 1x daily - 7x weekly  Supine Straight Leg Raises - 12 reps - 3 sets - 1x daily - 7x weekly                   Sidelying Hip Abduction - 12 reps - 3 sets - 1x daily - 7x weekly  Seated Table Hamstring Stretch - 3 reps - 1 sets - 30 sec Hold - 3x daily - 7x weekly  Gastroc Stretch on Wall - 3 reps - 1 sets - 30 sec hold - 3x daily - 7x weekly    Therapeutic Exercise and NMR EXR  [x] (02997) Provided verbal/tactile cueing for activities related to strengthening, flexibility, endurance, ROM for improvements in LE, proximal hip, and core control with self care, mobility, lifting, ambulation.  [] (94870) Provided verbal/tactile cueing for activities related to improving balance, coordination, kinesthetic sense, posture, motor skill, proprioception  to assist with LE, proximal hip, and core control in self care, mobility, lifting, ambulation and eccentric single leg control.      NMR and Therapeutic Activities:    [] (39308 or 26547) Provided verbal/tactile cueing for activities related to improving balance, coordination, kinesthetic sense, posture, motor skill, proprioception and motor activation to allow for proper function of core, proximal hip and LE with self care and ADLs  [] (37961) Gait Re-education- Provided training and instruction to the patient for proper LE, core and proximal hip recruitment and positioning and eccentric body weight control with ambulation re-education including up and down stairs     Home Exercise Program:    [x] (61921) Reviewed/Progressed HEP activities related to strengthening, flexibility, endurance, ROM of core, proximal hip and LE for functional self-care, mobility, lifting and ambulation/stair navigation   [] (33571)Reviewed/Progressed HEP activities related to improving balance, coordination, kinesthetic sense, posture, motor skill, proprioception of core, proximal hip and LE for self care, mobility, lifting, and ambulation/stair navigation      Manual Treatments:  PROM / STM / Oscillations-Mobs:  G-I, II, III, IV (PA's, Inf., Post.)  [x] (57078) Provided manual therapy to mobilize LE, proximal hip and/or LS spine soft tissue/joints for the purpose of modulating pain, promoting relaxation,  increasing ROM, reducing/eliminating soft tissue swelling/inflammation/restriction, improving soft tissue extensibility and allowing for proper ROM for normal function with self care, mobility, lifting and ambulation. Modalities:     [] GAME READY (VASO)- for significant edema, swelling, pain control.   - 15 minutes    Charges:  Timed Code Treatment Minutes: 60   Total Treatment Minutes: 60     Time In: 1:48pm  Time Out: 2:48pm      [] EVAL (LOW) 41774 (typically 20 minutes face-to-face)  [] EVAL (MOD) 37030 (typically 30 minutes face-to-face)  [] EVAL (HIGH) 09976 (typically 45 minutes face-to-face)  [] RE-EVAL     [x] XZ(92133) x 2    [] IONTO  [] NMR (09513) x     [x] VASO-GameReady for 15 minutes on Low, max cold   [x] Manual (69711) x 2 [] Other:  [] TA x      [] [facility] (45191)  [] ES(attended) (03693)      [] ES (un) (73507):       GOALS:  Patient stated goal: Patient will be able to walk 30 minutes without restriction or onset or right knee pain. [x] Progressing: [] Met: [] Not Met: [] Adjusted    Therapist goals for Patient:   Short Term Goals: To be achieved in: 2 weeks  1. Independent in HEP and progression per patient tolerance, in order to prevent re-injury. [x] Progressing: [] Met: [] Not Met: [] Adjusted  2. Patient will have a decrease in pain to facilitate improvement in movement, function, and ADLs as indicated by Functional Deficits. [x] Progressing: [] Met: [] Not Met: [] Adjusted    Long Term Goals: To be achieved in: 12 weeks  1. Disability index score of 30% or less for the LEFS to assist with reaching prior level of function. [x] Progressing: [] Met: [] Not Met: [] Adjusted  2. Patient will demonstrate ROM on affected side equal to the unaffected side to allow for proper joint functioning as indicated by patients Functional Deficits. [x] Progressing: [] Met: [] Not Met: [] Adjusted  3. Patient will demonstrate an increase in Strength to good proximal hip strength and control, within 5lb HHD in LE to allow for proper functional mobility as indicated by patients Functional Deficits. [x] Progressing: [] Met: [] Not Met: [] Adjusted  4. Patient will return to ascend/descend stairs reciprocallyy without increased symptoms or restriction. [x] Progressing: [] Met: [] Not Met: [] Adjusted  5. Patient will be able to perform a sit-to-stand without UE assist with no onset of right knee pain. [x] Progressing: [] Met: [] Not Met: [] Adjusted    Progression Towards Functional goals:  [x] Patient is progressing as expected towards functional goals listed. [] Progression is slowed due to complexities listed. [] Progression has been slowed due to co-morbidities.   [] Plan just implemented, too soon to assess goals progression  [] Other:         Overall Progression Towards Functional goals/ Treatment Progress Update:  [x] Patient is progressing as expected towards functional goals listed. [] Progression is slowed due to complexities/Impairments listed. [] Progression has been slowed due to co-morbidities. [] Plan just implemented, too soon to assess goals progression <30days   [] Goals require adjustment due to lack of progress  [] Patient is not progressing as expected and requires additional follow up with physician  [] Other    Prognosis for POC: [x] Good [] Fair  [] Poor      Patient requires continued skilled intervention: [x] Yes  [] No    Treatment/Activity Tolerance:  [x] Patient able to complete treatment  [] Patient limited by fatigue  [] Patient limited by pain    [] Patient limited by other medical complications  [] Other:     ASSESSMENT: Patient tolerated treatment well. Therapeutic exercise was progressed today without adverse effect. Her knee flexion ROM is remains the more restricted motion. She requires skilled PT intervention in order to improve right knee ROM, decrease right knee swelling, increase right LE muscular endurance, and increase right LE strength in order to return to her prior level of function and activity without the onset of right knee pain.     Return to Play: (if applicable)   []  Stage 1: Intro to Strength   []  Stage 2: Return to Run and Strength   []  Stage 3: Return to Jump and Strength   []  Stage 4: Dynamic Strength and Agility   []  Stage 5: Sport Specific Training     []  Ready to Return to Play, Meets All Above Stages   []  Not Ready for Return to Sports   Comments:                               PLAN: 1-2x per week for 12 weeks (beginning 8/3/20, ending 10/26/20)  [x] Continue per plan of care [] Alter current plan (see comments above)  [] Plan of care initiated [] Hold pending MD visit [] Discharge      Electronically signed by:  Jelly Kwok PT    Note: If patient does not return for scheduled/ recommended follow up visits, this note will serve as a discharge from care along with most recent update on progress.

## 2020-08-18 ENCOUNTER — APPOINTMENT (OUTPATIENT)
Dept: PHYSICAL THERAPY | Age: 60
End: 2020-08-18
Payer: COMMERCIAL

## 2020-08-21 ENCOUNTER — HOSPITAL ENCOUNTER (OUTPATIENT)
Dept: PHYSICAL THERAPY | Age: 60
Setting detail: THERAPIES SERIES
Discharge: HOME | End: 2020-08-21
Payer: COMMERCIAL

## 2020-08-21 PROCEDURE — 97110 THERAPEUTIC EXERCISES: CPT

## 2020-08-21 PROCEDURE — 97140 MANUAL THERAPY 1/> REGIONS: CPT

## 2020-08-21 NOTE — FLOWSHEET NOTE
[facility], [address]  Phone: [phone]  Fax [phone]    Physical Therapy Treatment Note/ Progress Report:           Date:  2020    Patient Name:  Radha Sena    :  1960  MRN: [id number]  Restrictions/Precautions:    Medical/Treatment Diagnosis Information:  · Diagnosis: X26.668 (ICD-10-CM) - Status post total knee replacement, right  · Treatment Diagnosis: Right Knee Pain (M25.561); Right Knee Effusion (M25.461); Right Kneee Stiffness (M25.661); Right Lower Extremity Weakness (C78.97)  Insurance/Certification information:  PT Insurance Information: Haylie Aguilar 150  Physician Information:  Referring Practitioner: MARIO Cam  Has the plan of care been signed (Y/N):        []  Yes  [x]  No     Date of Patient follow up with Physician: TERELLD      Is this a Progress Report:     []  Yes  [x]  No        If Yes:  Date Range for reporting period:  Beginnin/3/20  Ending:    Progress report will be due (10 Rx or 30 days whichever is less): 93       Recertification will be due (POC Duration  / 90 days whichever is less): 10/26/20         Visit # Insurance Allowable Requires auth   5 $[id number]-20PT    []no        [x]yes:     Functional Scale (LEFS):  · 8/3/20: 63.75% Disability (29/80)        Latex Allergy:  [x]NO      []YES  Preferred Language for Healthcare:   [x]English       []other:      Pain level:  2/10     SUBJECTIVE:  Patient notes no major instances of pain on her week long trip to [place]. She performed her HEP at least 2x per day with most days 3x per day. She notes her knee continues to feel more stiff than painful. OBJECTIVE:   Right Knee AROM: -14- // -8-   No increased warmth, firmness, or redness in right calf.     RESTRICTIONS/PRECAUTIONS: WBAT    Exercises/Interventions:     Therapeutic Ex (61171) Volume Notes/CUES   Leg Press 6 min, 40# Manual Intervention (97193) 32    MLD to right thigh and leg     STM to right quadriceps, hamstring, and calf     Grade III-IV inferior glides of the right patella. Grade III-IV anterior glides of the right tibia. Manual PNF stretching of the right hamstrings. Grade III-IV posterior glides of the right tibia. Manual PNF stretching of the right quadrceps                    NMR re-education (64296)  CUES NEEDED                                                Therapeutic Activity (08976)                                   Additional time was spent explaining exercise instruction, exercise set up, and rest breaks. Patient Education:   · Continue Current HEP. · PT will contact MD regarding difficulty regaining ROM. Access Code: [email or web address]   URL: [email or web address]   Date: 08/10/2020   Prepared by: Moy Valdes     Exercises  Long Sitting Quad Set - 1 sets - 3 hold - 6 Time - 3x daily - 7x weekly  Sitting Heel Slide with Towel - 1 sets - 1 sec hold - 6 minutes Time - 3x daily - 7x weekly  Seated Long Arc Quad - 12 reps - 3 sets - 1x daily - 7x weekly  Supine Straight Leg Raises - 12 reps - 3 sets - 1x daily - 7x weekly                   Sidelying Hip Abduction - 12 reps - 3 sets - 1x daily - 7x weekly  Seated Table Hamstring Stretch - 3 reps - 1 sets - 30 sec Hold - 3x daily - 7x weekly  Gastroc Stretch on Wall - 3 reps - 1 sets - 30 sec hold - 3x daily - 7x weekly    Therapeutic Exercise and NMR EXR  [x] (80946) Provided verbal/tactile cueing for activities related to strengthening, flexibility, endurance, ROM for improvements in LE, proximal hip, and core control with self care, mobility, lifting, ambulation.  [] (32925) Provided verbal/tactile cueing for activities related to improving balance, coordination, kinesthetic sense, posture, motor skill, proprioception  to assist with LE, proximal hip, and core control in self care, mobility, lifting, ambulation and eccentric single leg control. NMR and Therapeutic Activities:    [] (61256 or 82106) Provided verbal/tactile cueing for activities related to improving balance, coordination, kinesthetic sense, posture, motor skill, proprioception and motor activation to allow for proper function of core, proximal hip and LE with self care and ADLs  [] (87679) Gait Re-education- Provided training and instruction to the patient for proper LE, core and proximal hip recruitment and positioning and eccentric body weight control with ambulation re-education including up and down stairs     Home Exercise Program:    [x] (45536) Reviewed/Progressed HEP activities related to strengthening, flexibility, endurance, ROM of core, proximal hip and LE for functional self-care, mobility, lifting and ambulation/stair navigation   [] (76178)Reviewed/Progressed HEP activities related to improving balance, coordination, kinesthetic sense, posture, motor skill, proprioception of core, proximal hip and LE for self care, mobility, lifting, and ambulation/stair navigation      Manual Treatments:  PROM / STM / Oscillations-Mobs:  G-I, II, III, IV (PA's, Inf., Post.)  [x] (06672) Provided manual therapy to mobilize LE, proximal hip and/or LS spine soft tissue/joints for the purpose of modulating pain, promoting relaxation,  increasing ROM, reducing/eliminating soft tissue swelling/inflammation/restriction, improving soft tissue extensibility and allowing for proper ROM for normal function with self care, mobility, lifting and ambulation. Modalities:     [] GAME READY (VASO)- for significant edema, swelling, pain control.   - 15 minutes    Charges:  Timed Code Treatment Minutes: 40   Total Treatment Minutes: 40     Time In: 11:17am  Time Out: 11:57amm      [] EVAL (LOW) 81346 (typically 20 minutes face-to-face)  [] EVAL (MOD) 09417 (typically 30 minutes face-to-face)  [] EVAL (HIGH) 26911 (typically 45 minutes face-to-face)  [] RE-EVAL     [x] WN(06158) x 1    [] IONTO  [] NMR (38851) x     [x] VASO-GameReady for 15 minutes on Low, max cold   [x] Manual (77901) x 2     [] Other:  [] TA x      [] Mech Traction (32555)  [] ES(attended) (34026)      [] ES (un) (21340):       GOALS:  Patient stated goal: Patient will be able to walk 30 minutes without restriction or onset or right knee pain. [x] Progressing: [] Met: [] Not Met: [] Adjusted    Therapist goals for Patient:   Short Term Goals: To be achieved in: 2 weeks  1. Independent in HEP and progression per patient tolerance, in order to prevent re-injury. [] Progressing: [x] Met: [] Not Met: [] Adjusted  2. Patient will have a decrease in pain to facilitate improvement in movement, function, and ADLs as indicated by Functional Deficits. [] Progressing: [x] Met: [] Not Met: [] Adjusted    Long Term Goals: To be achieved in: 12 weeks  1. Disability index score of 30% or less for the LEFS to assist with reaching prior level of function. [x] Progressing: [] Met: [] Not Met: [] Adjusted  2. Patient will demonstrate ROM on affected side equal to the unaffected side to allow for proper joint functioning as indicated by patients Functional Deficits. [x] Progressing: [] Met: [] Not Met: [] Adjusted  3. Patient will demonstrate an increase in Strength to good proximal hip strength and control, within 5lb HHD in LE to allow for proper functional mobility as indicated by patients Functional Deficits. [x] Progressing: [] Met: [] Not Met: [] Adjusted  4. Patient will return to ascend/descend stairs reciprocallyy without increased symptoms or restriction. [x] Progressing: [] Met: [] Not Met: [] Adjusted  5. Patient will be able to perform a sit-to-stand without UE assist with no onset of right knee pain. [x] Progressing: [] Met: [] Not Met: [] Adjusted    Progression Towards Functional goals:  [x] Patient is progressing as expected towards functional goals listed. [] Progression is slowed due to complexities listed.   [] Progression has been slowed due to co-morbidities. [] Plan just implemented, too soon to assess goals progression  [] Other:         Overall Progression Towards Functional goals/ Treatment Progress Update:  [x] Patient is progressing as expected towards functional goals listed. [] Progression is slowed due to complexities/Impairments listed. [] Progression has been slowed due to co-morbidities. [] Plan just implemented, too soon to assess goals progression <30days   [] Goals require adjustment due to lack of progress  [] Patient is not progressing as expected and requires additional follow up with physician  [] Other    Prognosis for POC: [x] Good [] Fair  [] Poor      Patient requires continued skilled intervention: [x] Yes  [] No    Treatment/Activity Tolerance:  [x] Patient able to complete treatment  [] Patient limited by fatigue  [] Patient limited by pain    [] Patient limited by other medical complications  [] Other:     ASSESSMENT: Patient tolerated treatment well. PT was able to improve her right knee AROM with manual therapy, but the loss of motion she had over her week vacation is concerning. PT will contact surgeon's office to see if he would like her to continue with PT or if he would like to  intervene at present. She requires skilled PT intervention in order to improve right knee ROM, decrease right knee swelling, increase right LE muscular endurance, and increase right LE strength in order to return to her prior level of function and activity without the onset of right knee pain.     Return to Play: (if applicable)   []  Stage 1: Intro to Strength   []  Stage 2: Return to Run and Strength   []  Stage 3: Return to Jump and Strength   []  Stage 4: Dynamic Strength and Agility   []  Stage 5: Sport Specific Training     []  Ready to Return to Play, Meets All Above Stages   []  Not Ready for Return to Sports   Comments:                               PLAN: 1-2x per week for 12 weeks (beginning 8/3/20, ending 10/26/20)  [x] Continue per plan of care [] Alter current plan (see comments above)  [] Plan of care initiated [] Hold pending MD visit [] Discharge      Electronically signed by:  Andrew Merritt PT    Note: If patient does not return for scheduled/ recommended follow up visits, this note will serve as a discharge from care along with most recent update on progress.

## 2020-08-24 ENCOUNTER — HOSPITAL ENCOUNTER (OUTPATIENT)
Dept: PHYSICAL THERAPY | Age: 60
Setting detail: THERAPIES SERIES
Discharge: HOME | End: 2020-08-24
Payer: COMMERCIAL

## 2020-08-24 PROCEDURE — 97110 THERAPEUTIC EXERCISES: CPT

## 2020-08-24 PROCEDURE — 97140 MANUAL THERAPY 1/> REGIONS: CPT

## 2020-08-24 NOTE — FLOWSHEET NOTE
[facility], [address]  Phone: [phone]  Fax [phone]    Physical Therapy Treatment Note/ Progress Report:           Date:  2020    Patient Name:  Saji Diaz    :  1960  MRN: [id number]  Restrictions/Precautions:    Medical/Treatment Diagnosis Information:  · Diagnosis: O21.861 (ICD-10-CM) - Status post total knee replacement, right  · Treatment Diagnosis: Right Knee Pain (M25.561); Right Knee Effusion (M25.461); Right Kneee Stiffness (M25.661); Right Lower Extremity Weakness (V02.43)  Insurance/Certification information:  PT Insurance Information: Fredi Gonzalez  Physician Information:  Referring Practitioner: MARIO Anderson  Has the plan of care been signed (Y/N):        []  Yes  [x]  No     Date of Patient follow up with Physician: TBD      Is this a Progress Report:     []  Yes  [x]  No        If Yes:  Date Range for reporting period:  Beginnin/3/20  Ending:    Progress report will be due (10 Rx or 30 days whichever is less): 64       Recertification will be due (POC Duration  / 90 days whichever is less): 10/26/20         Visit # Insurance Allowable Requires auth   6 [id number]    []no        [x]yes:     Functional Scale (LEFS):  · 8/3/20: 63.75% Disability (29/80)        Latex Allergy:  [x]NO      []YES  Preferred Language for Healthcare:   [x]English       []other:      Pain level:  2/10     SUBJECTIVE:  Patient notes no major instances of pain since her last treatment. She notes her knee continues to feel more stiff than painful. Bending her knee is more difficulty than straightening it. OBJECTIVE:   Right Knee AROM: - // -   No increased warmth, firmness, or redness in right calf.     RESTRICTIONS/PRECAUTIONS: WBAT    Exercises/Interventions:     Therapeutic Ex (79453) Volume Notes/CUES   Leg Press 6 min, 40#    Bike 6 min, oscillating Manual Intervention (90114) 25    MLD to right thigh and leg     STM to right quadriceps, hamstring, and calf     Grade III-IV inferior glides of the right patella. Grade III-IV anterior glides of the right tibia. Manual PNF stretching of the right hamstrings. Grade III-IV posterior glides of the right tibia. Manual PNF stretching of the right quadrceps                    NMR re-education (11089)  CUES NEEDED                                                Therapeutic Activity (26365)                                   Additional time was spent explaining exercise instruction, exercise set up, and rest breaks. Patient Education:   · Continue Current HEP. · Her surgeon responded to PT's email and would like to see her this week. · Perform quad sets and knee flexion during her 50 min drive following PT. Benedict Sorenson Access Code: [email or web address]   URL: [email or web address]   Date: 08/10/2020   Prepared by: Marva Poe     Exercises  Long Sitting Quad Set - 1 sets - 3 hold - 6 Time - 3x daily - 7x weekly  Sitting Heel Slide with Towel - 1 sets - 1 sec hold - 6 minutes Time - 3x daily - 7x weekly  Seated Long Arc Quad - 12 reps - 3 sets - 1x daily - 7x weekly  Supine Straight Leg Raises - 12 reps - 3 sets - 1x daily - 7x weekly                   Sidelying Hip Abduction - 12 reps - 3 sets - 1x daily - 7x weekly  Seated Table Hamstring Stretch - 3 reps - 1 sets - 30 sec Hold - 3x daily - 7x weekly  Gastroc Stretch on Wall - 3 reps - 1 sets - 30 sec hold - 3x daily - 7x weekly    Therapeutic Exercise and NMR EXR  [x] (14586) Provided verbal/tactile cueing for activities related to strengthening, flexibility, endurance, ROM for improvements in LE, proximal hip, and core control with self care, mobility, lifting, ambulation.  [] (31241) Provided verbal/tactile cueing for activities related to improving balance, coordination, kinesthetic sense, posture, motor skill, proprioception  to assist with LE, proximal hip, and core control in self care, mobility, lifting, ambulation and eccentric single leg control. NMR and Therapeutic Activities:    [] (68585 or 41987) Provided verbal/tactile cueing for activities related to improving balance, coordination, kinesthetic sense, posture, motor skill, proprioception and motor activation to allow for proper function of core, proximal hip and LE with self care and ADLs  [] (85544) Gait Re-education- Provided training and instruction to the patient for proper LE, core and proximal hip recruitment and positioning and eccentric body weight control with ambulation re-education including up and down stairs     Home Exercise Program:    [x] (72130) Reviewed/Progressed HEP activities related to strengthening, flexibility, endurance, ROM of core, proximal hip and LE for functional self-care, mobility, lifting and ambulation/stair navigation   [] (17483)Reviewed/Progressed HEP activities related to improving balance, coordination, kinesthetic sense, posture, motor skill, proprioception of core, proximal hip and LE for self care, mobility, lifting, and ambulation/stair navigation      Manual Treatments:  PROM / STM / Oscillations-Mobs:  G-I, II, III, IV (PA's, Inf., Post.)  [x] (43291) Provided manual therapy to mobilize LE, proximal hip and/or LS spine soft tissue/joints for the purpose of modulating pain, promoting relaxation,  increasing ROM, reducing/eliminating soft tissue swelling/inflammation/restriction, improving soft tissue extensibility and allowing for proper ROM for normal function with self care, mobility, lifting and ambulation. Modalities:     [] GAME READY (VASO)- for significant edema, swelling, pain control.   - 15 minutes    Charges:  Timed Code Treatment Minutes: 43   Total Treatment Minutes: 43     Time In: 12:46pm  Time Out: 1:29pm      [] EVAL (LOW) 20073 (typically 20 minutes face-to-face)  [] EVAL (MOD) 24030 (typically 30 minutes face-to-face)  [] EVAL (HIGH) 85021 (typically 45 minutes face-to-face)  [] RE-EVAL     [x] HG(68312) x 1    [] IONTO  [] NMR (24277) x     [x] VASO-GameReady for 15 minutes on Low, max cold   [x] Manual (58700) x 2     [] Other:  [] TA x      [] Mech Traction (97614)  [] ES(attended) (20230)      [] ES (un) (17256):       GOALS:  Patient stated goal: Patient will be able to walk 30 minutes without restriction or onset or right knee pain. [x] Progressing: [] Met: [] Not Met: [] Adjusted    Therapist goals for Patient:   Short Term Goals: To be achieved in: 2 weeks  1. Independent in HEP and progression per patient tolerance, in order to prevent re-injury. [] Progressing: [x] Met: [] Not Met: [] Adjusted  2. Patient will have a decrease in pain to facilitate improvement in movement, function, and ADLs as indicated by Functional Deficits. [] Progressing: [x] Met: [] Not Met: [] Adjusted    Long Term Goals: To be achieved in: 12 weeks  1. Disability index score of 30% or less for the LEFS to assist with reaching prior level of function. [x] Progressing: [] Met: [] Not Met: [] Adjusted  2. Patient will demonstrate ROM on affected side equal to the unaffected side to allow for proper joint functioning as indicated by patients Functional Deficits. [x] Progressing: [] Met: [] Not Met: [] Adjusted  3. Patient will demonstrate an increase in Strength to good proximal hip strength and control, within 5lb HHD in LE to allow for proper functional mobility as indicated by patients Functional Deficits. [x] Progressing: [] Met: [] Not Met: [] Adjusted  4. Patient will return to ascend/descend stairs reciprocallyy without increased symptoms or restriction. [x] Progressing: [] Met: [] Not Met: [] Adjusted  5. Patient will be able to perform a sit-to-stand without UE assist with no onset of right knee pain.    [x] Progressing: [] Met: [] Not Met: [] Adjusted    Progression Towards Functional goals:  [x] Patient is progressing as expected towards functional goals listed. [] Progression is slowed due to complexities listed. [] Progression has been slowed due to co-morbidities. [] Plan just implemented, too soon to assess goals progression  [] Other:         Overall Progression Towards Functional goals/ Treatment Progress Update:  [x] Patient is progressing as expected towards functional goals listed. [] Progression is slowed due to complexities/Impairments listed. [] Progression has been slowed due to co-morbidities. [] Plan just implemented, too soon to assess goals progression <30days   [] Goals require adjustment due to lack of progress  [] Patient is not progressing as expected and requires additional follow up with physician  [] Other    Prognosis for POC: [x] Good [] Fair  [] Poor      Patient requires continued skilled intervention: [x] Yes  [] No    Treatment/Activity Tolerance:  [x] Patient able to complete treatment  [] Patient limited by fatigue  [] Patient limited by pain    [] Patient limited by other medical complications  [] Other:     ASSESSMENT: Patient tolerated treatment well. She will make an appointment for this week for him to evaluate her progress. She requires skilled PT intervention in order to improve right knee ROM, decrease right knee swelling, increase right LE muscular endurance, and increase right LE strength in order to return to her prior level of function and activity without the onset of right knee pain.     Return to Play: (if applicable)   []  Stage 1: Intro to Strength   []  Stage 2: Return to Run and Strength   []  Stage 3: Return to Jump and Strength   []  Stage 4: Dynamic Strength and Agility   []  Stage 5: Sport Specific Training     []  Ready to Return to Play, Meets All Above Stages   []  Not Ready for Return to Sports   Comments:                               PLAN: 1-2x per week for 12 weeks (beginning 8/3/20, ending 10/26/20)  [x] Continue per plan of care [] Alter current plan (see comments above)  [] Plan of care initiated [] Hold pending MD visit [] Discharge      Electronically signed by:  Moy Valdes PT    Note: If patient does not return for scheduled/ recommended follow up visits, this note will serve as a discharge from care along with most recent update on progress.

## 2020-08-26 ENCOUNTER — OFFICE VISIT (OUTPATIENT)
Dept: ORTHOPEDIC SURGERY | Age: 60
End: 2020-08-26

## 2020-08-26 VITALS — HEIGHT: 67 IN | WEIGHT: 227 LBS | BODY MASS INDEX: 35.63 KG/M2

## 2020-08-26 PROCEDURE — 99024 POSTOP FOLLOW-UP VISIT: CPT | Performed by: ORTHOPAEDIC SURGERY

## 2020-08-27 ENCOUNTER — HOSPITAL ENCOUNTER (OUTPATIENT)
Dept: PHYSICAL THERAPY | Age: 60
Setting detail: THERAPIES SERIES
Discharge: HOME | End: 2020-08-27
Payer: COMMERCIAL

## 2020-08-27 PROCEDURE — 97140 MANUAL THERAPY 1/> REGIONS: CPT

## 2020-08-27 PROCEDURE — 97110 THERAPEUTIC EXERCISES: CPT

## 2020-08-27 NOTE — FLOWSHEET NOTE
[facility] [address] [place]  Phone: [phone]  Fax [phone]    Physical Therapy Treatment Note/ Progress Report:           Date:  2020    Patient Name:  Mary Murguia    :  1960  MRN: [id number]  Restrictions/Precautions:    Medical/Treatment Diagnosis Information:  · Diagnosis: S19.240 (ICD-10-CM) - Status post total knee replacement, right  · Treatment Diagnosis: Right Knee Pain (M25.561); Right Knee Effusion (M25.461); Right Kneee Stiffness (M25.661); Right Lower Extremity Weakness (C19.95)  Insurance/Certification information:  PT Insurance Information: Haylie Aguilar 150  Physician Information:  Referring Practitioner: MARIO Lujan  Has the plan of care been signed (Y/N):        []  Yes  [x]  No     Date of Patient follow up with Physician: TBD      Is this a Progress Report:     []  Yes  [x]  No        If Yes:  Date Range for reporting period:  Beginnin/3/20  Ending:    Progress report will be due (10 Rx or 30 days whichever is less): 23       Recertification will be due (POC Duration  / 90 days whichever is less): 10/26/20         Visit # Insurance Allowable Requires auth   7 [id number]    []no        [x]yes:     Functional Scale (LEFS):  · 8/3/20: 63.75% Disability (29/80)        Latex Allergy:  [x]NO      []YES  Preferred Language for Healthcare:   [x]English       []other:      Pain level:  Low, more stiff than painful     SUBJECTIVE:  Patient notes persistent stiffness, though her knee feels less swollen today. She has continued to where a compression stalking on her right leg. She saw Dr. Landon Villegas yesterday and she reports he thinks she is where she needs to be post-operatively. She returns to work on Monday , 20, and notes she feels comfortable returning to her regular work activities.     OBJECTIVE:   Right Knee AROM (Post Manual Therapy): -5-96   No increased warmth, firmness, or redness improvements in LE, proximal hip, and core control with self care, mobility, lifting, ambulation.  [] (73494) Provided verbal/tactile cueing for activities related to improving balance, coordination, kinesthetic sense, posture, motor skill, proprioception  to assist with LE, proximal hip, and core control in self care, mobility, lifting, ambulation and eccentric single leg control. NMR and Therapeutic Activities:    [x] (90953 or 65207) Provided verbal/tactile cueing for activities related to improving balance, coordination, kinesthetic sense, posture, motor skill, proprioception and motor activation to allow for proper function of core, proximal hip and LE with self care and ADLs  [] (80703) Gait Re-education- Provided training and instruction to the patient for proper LE, core and proximal hip recruitment and positioning and eccentric body weight control with ambulation re-education including up and down stairs     Home Exercise Program:    [x] (57294) Reviewed/Progressed HEP activities related to strengthening, flexibility, endurance, ROM of core, proximal hip and LE for functional self-care, mobility, lifting and ambulation/stair navigation   [] (59061)Reviewed/Progressed HEP activities related to improving balance, coordination, kinesthetic sense, posture, motor skill, proprioception of core, proximal hip and LE for self care, mobility, lifting, and ambulation/stair navigation      Manual Treatments:  PROM / STM / Oscillations-Mobs:  G-I, II, III, IV (PA's, Inf., Post.)  [x] (50990) Provided manual therapy to mobilize LE, proximal hip and/or LS spine soft tissue/joints for the purpose of modulating pain, promoting relaxation,  increasing ROM, reducing/eliminating soft tissue swelling/inflammation/restriction, improving soft tissue extensibility and allowing for proper ROM for normal function with self care, mobility, lifting and ambulation.      Modalities:     [] GAME READY (VASO)- for significant edema, swelling, pain control. - 15 minutes    Charges:  Timed Code Treatment Minutes: 43   Total Treatment Minutes: 43     Time In: 12:46pm  Time Out: 1:29pm      [] EVAL (LOW) 76593 (typically 20 minutes face-to-face)  [] EVAL (MOD) 67433 (typically 30 minutes face-to-face)  [] EVAL (HIGH) 54427 (typically 45 minutes face-to-face)  [] RE-EVAL     [x] OR(27405) x 2    [] IONTO  [] NMR (51566) x     [x] VASO-GameReady for 15 minutes on Low, max cold   [x] Manual (48860) x 2     [] Other:  [] TA x      [] Mech Traction (87520)  [] ES(attended) (09537)      [] ES (un) (43984):       GOALS:  Patient stated goal: Patient will be able to walk 30 minutes without restriction or onset or right knee pain. [x] Progressing: [] Met: [] Not Met: [] Adjusted    Therapist goals for Patient:   Short Term Goals: To be achieved in: 2 weeks  1. Independent in HEP and progression per patient tolerance, in order to prevent re-injury. [] Progressing: [x] Met: [] Not Met: [] Adjusted  2. Patient will have a decrease in pain to facilitate improvement in movement, function, and ADLs as indicated by Functional Deficits. [] Progressing: [x] Met: [] Not Met: [] Adjusted    Long Term Goals: To be achieved in: 12 weeks  1. Disability index score of 30% or less for the LEFS to assist with reaching prior level of function. [x] Progressing: [] Met: [] Not Met: [] Adjusted  2. Patient will demonstrate ROM on affected side equal to the unaffected side to allow for proper joint functioning as indicated by patients Functional Deficits. [x] Progressing: [] Met: [] Not Met: [] Adjusted  3. Patient will demonstrate an increase in Strength to good proximal hip strength and control, within 5lb HHD in LE to allow for proper functional mobility as indicated by patients Functional Deficits. [x] Progressing: [] Met: [] Not Met: [] Adjusted  4. Patient will return to ascend/descend stairs reciprocallyy without increased symptoms or restriction.    [x] 5: Sport Specific Training     []  Ready to Return to Play, Meets All Above Stages   []  Not Ready for Return to Sports   Comments:                               PLAN: 1-2x per week for 12 weeks (beginning 8/3/20, ending 10/26/20)  [x] Continue per plan of care [] Heydi Alonso current plan (see comments above)  [] Plan of care initiated [] Hold pending MD visit [] Discharge      Electronically signed by: Shivani Olsen PT, DPT ([place] PT License #: [id number])    Note: If patient does not return for scheduled/ recommended follow up visits, this note will serve as a discharge from care along with most recent update on progress.

## 2020-09-03 ENCOUNTER — HOSPITAL ENCOUNTER (OUTPATIENT)
Dept: PHYSICAL THERAPY | Age: 60
Setting detail: THERAPIES SERIES
Discharge: HOME | End: 2020-09-03
Payer: COMMERCIAL

## 2020-09-03 PROCEDURE — 97110 THERAPEUTIC EXERCISES: CPT

## 2020-09-03 PROCEDURE — 97140 MANUAL THERAPY 1/> REGIONS: CPT

## 2020-09-03 NOTE — FLOWSHEET NOTE
[facility],  [address]  Phone: [phone]  Fax [phone]    Physical Therapy Treatment Note/ Progress Report:           Date:  9/3/2020    Patient Name:  Saji Diaz    :  1960  MRN: [id number]  Restrictions/Precautions:    Medical/Treatment Diagnosis Information:  · Diagnosis: Z55.973 (ICD-10-CM) - Status post total knee replacement, right  · Treatment Diagnosis: Right Knee Pain (M25.561); Right Knee Effusion (M25.461); Right Kneee Stiffness (M25.661); Right Lower Extremity Weakness (F94.32)  Insurance/Certification information:  PT Insurance Information: [facility] [employer]  Physician Information:  Referring Practitioner: MARIO Anderson  Has the plan of care been signed (Y/N):        []  Yes  [x]  No     Date of Patient follow up with Physician: TERELLD      Is this a Progress Report:     [x]  Yes  []  No        If Yes:  Date Range for reporting period:  Beginnin/3/20  Endin/3/20    Progress report will be due (10 Rx or 30 days whichever is less):        Recertification will be due (POC Duration  / 90 days whichever is less): 10/26/20         Visit # Insurance Allowable Requires auth   8 [id number]    []no        [x]yes:     Functional Scale (LEFS):  · 8/3/20: 63.75% Disability (29/80)   · 9/3/20: 16.25% disability (67/80)       Latex Allergy:  [x]NO      []YES  Preferred Language for Healthcare:   [x]English       []other:      Pain level:  Low, more stiff than painful     SUBJECTIVE:  Patient returned to work this week. No adverse effects. She notes she his constantly flexing and extending her knee throughout the day and reports it feels like it is moving better. OBJECTIVE:   Right Knee AROM (Post Manual Therapy): -12-84 // -7-94   No increased warmth, firmness, or redness in right calf.     RESTRICTIONS/PRECAUTIONS: WBAT    Exercises/Interventions:     Therapeutic Ex (83704) Volume Notes/CUES Prone Quad Stretch with Strap 5', 5\" on / 5\" off    Seated Hamstring Stretch 5', 5\" on / 5\" off    Leg Press 6 min, 40#    Circuit (3x)  -Squat-  -Staggered Stance Squat  -Unilateral Marching   12x  12x bilaterally  12x bilaterally All Exercises performed with UE assist on True Stretch                                           Manual Intervention (76729) 23 min    STM to right quadriceps, hamstring, and calf     Grade III-IV inferior glides of the right patella. Grade III-IV Superior glides of the right patella     Grade III-IV extension mobilizations of the right knee     Grade III-IV anterior glides of the right tibia. Manual PNF stretching of the right hamstrings. Grade III-IV posterior glides of the right tibia. Manual PNF stretching of the right quadriceps                    NMR re-education (67346)  CUES NEEDED                                           Therapeutic Activity (22218)                                   Additional time was spent explaining exercise instruction, exercise set up, and rest breaks. Patient Education:   · Add strap stretching to HEP. Perform HEP 2x per day due to work schedule. Access Code: [email or web address]   URL: [email or web address]   Date: 08/10/2020   Prepared by: Jennifer Childs     Exercises  Long Sitting Quad Set - 1 sets - 3 hold - 6 Time - 3x daily - 7x weekly  Sitting Heel Slide with Towel - 1 sets - 1 sec hold - 6 minutes Time - 3x daily - 7x weekly  Seated Long Arc Quad - 12 reps - 3 sets - 1x daily - 7x weekly  Supine Straight Leg Raises - 12 reps - 3 sets - 1x daily - 7x weekly                   Sidelying Hip Abduction - 12 reps - 3 sets - 1x daily - 7x weekly  Seated Table Hamstring Stretch - 3 reps - 1 sets - 30 sec Hold - 3x daily - 7x weekly  Gastroc Stretch on Wall - 3 reps - 1 sets - 30 sec hold - 3x daily - 7x weekly    Therapeutic Exercise and NMR EXR  [x] (08923) Provided verbal/tactile cueing for activities related to strengthening, flexibility, endurance, ROM for improvements in LE, proximal hip, and core control with self care, mobility, lifting, ambulation.  [] (34496) Provided verbal/tactile cueing for activities related to improving balance, coordination, kinesthetic sense, posture, motor skill, proprioception  to assist with LE, proximal hip, and core control in self care, mobility, lifting, ambulation and eccentric single leg control. NMR and Therapeutic Activities:    [] (87868 or 52151) Provided verbal/tactile cueing for activities related to improving balance, coordination, kinesthetic sense, posture, motor skill, proprioception and motor activation to allow for proper function of core, proximal hip and LE with self care and ADLs  [] (57095) Gait Re-education- Provided training and instruction to the patient for proper LE, core and proximal hip recruitment and positioning and eccentric body weight control with ambulation re-education including up and down stairs     Home Exercise Program:    [x] (34934) Reviewed/Progressed HEP activities related to strengthening, flexibility, endurance, ROM of core, proximal hip and LE for functional self-care, mobility, lifting and ambulation/stair navigation   [] (37166)Reviewed/Progressed HEP activities related to improving balance, coordination, kinesthetic sense, posture, motor skill, proprioception of core, proximal hip and LE for self care, mobility, lifting, and ambulation/stair navigation      Manual Treatments:  PROM / STM / Oscillations-Mobs:  G-I, II, III, IV (PA's, Inf., Post.)  [x] (64963) Provided manual therapy to mobilize LE, proximal hip and/or LS spine soft tissue/joints for the purpose of modulating pain, promoting relaxation,  increasing ROM, reducing/eliminating soft tissue swelling/inflammation/restriction, improving soft tissue extensibility and allowing for proper ROM for normal function with self care, mobility, lifting and ambulation.      Modalities:     [] GAME READY (VASO)- for significant edema, swelling, pain control. - 15 minutes    Charges:  Timed Code Treatment Minutes: 62   Total Treatment Minutes: 62     Time In: 5:23pm  Time Out: 6:25pm      [] EVAL (LOW) 68008 (typically 20 minutes face-to-face)  [] EVAL (MOD) 45963 (typically 30 minutes face-to-face)  [] EVAL (HIGH) 43380 (typically 45 minutes face-to-face)  [] RE-EVAL     [x] OB(20235) x 2    [] IONTO  [] NMR (22710) x     [x] VASO-GameReady for 15 minutes on Low, max cold   [x] Manual (12176) x 2     [] Other:  [] TA x      [] Mech Traction (23659)  [] ES(attended) (95180)      [] ES (un) (72685):       GOALS:  Patient stated goal: Patient will be able to walk 30 minutes without restriction or onset or right knee pain. [x] Progressing: [] Met: [] Not Met: [] Adjusted    Therapist goals for Patient:   Short Term Goals: To be achieved in: 2 weeks  1. Independent in HEP and progression per patient tolerance, in order to prevent re-injury. [] Progressing: [x] Met: [] Not Met: [] Adjusted  2. Patient will have a decrease in pain to facilitate improvement in movement, function, and ADLs as indicated by Functional Deficits. [] Progressing: [x] Met: [] Not Met: [] Adjusted    Long Term Goals: To be achieved in: 12 weeks  1. Disability index score of 30% or less for the LEFS to assist with reaching prior level of function. [] Progressing: [x] Met: [] Not Met: [] Adjusted  2. Patient will demonstrate ROM on affected side equal to the unaffected side to allow for proper joint functioning as indicated by patients Functional Deficits. [x] Progressing: [] Met: [] Not Met: [] Adjusted  3. Patient will demonstrate an increase in Strength to good proximal hip strength and control, within 5lb HHD in LE to allow for proper functional mobility as indicated by patients Functional Deficits. [x] Progressing: [] Met: [] Not Met: [] Adjusted  4.  Patient will return to ascend/descend stairs reciprocallyy without increased symptoms or restriction. [x] Progressing: [] Met: [] Not Met: [] Adjusted  5. Patient will be able to perform a sit-to-stand without UE assist with no onset of right knee pain. [x] Progressing: [] Met: [] Not Met: [] Adjusted    Progression Towards Functional goals:  [x] Patient is progressing as expected towards functional goals listed. [] Progression is slowed due to complexities listed. [] Progression has been slowed due to co-morbidities. [] Plan just implemented, too soon to assess goals progression  [] Other:         Overall Progression Towards Functional goals/ Treatment Progress Update:  [x] Patient is progressing as expected towards functional goals listed. [] Progression is slowed due to complexities/Impairments listed. [] Progression has been slowed due to co-morbidities. [] Plan just implemented, too soon to assess goals progression <30days   [] Goals require adjustment due to lack of progress  [] Patient is not progressing as expected and requires additional follow up with physician  [] Other    Prognosis for POC: [x] Good [] Fair  [] Poor      Patient requires continued skilled intervention: [x] Yes  [] No    Treatment/Activity Tolerance:  [x] Patient able to complete treatment  [] Patient limited by fatigue  [] Patient limited by pain    [] Patient limited by other medical complications  [] Other:     ASSESSMENT: Patient tolerated treatment well. ROM remains the priority in PT. She requires skilled PT intervention in order to improve right knee ROM, decrease right knee swelling, increase right LE muscular endurance, and increase right LE strength in order to return to her prior level of function and activity without the onset of right knee pain.     Return to Play: (if applicable)   []  Stage 1: Intro to Strength   []  Stage 2: Return to Run and Strength   []  Stage 3: Return to Jump and Strength   []  Stage 4: Dynamic Strength and Agility   []  Stage 5: Sport Specific Training     []  Ready to Return to Play, Meets All Above Stages   []  Not Ready for Return to Sports   Comments:                               PLAN: 1-2x per week for 12 weeks (beginning 8/3/20, ending 10/26/20)  [x] Continue per plan of care [] Ness Bourgeois current plan (see comments above)  [] Plan of care initiated [] Hold pending MD visit [] Discharge      Electronically signed by: Marva Poe PT, DPT ([place] PT License #: [id number])    Note: If patient does not return for scheduled/ recommended follow up visits, this note will serve as a discharge from care along with most recent update on progress.

## 2020-09-10 ENCOUNTER — HOSPITAL ENCOUNTER (OUTPATIENT)
Dept: PHYSICAL THERAPY | Age: 60
Setting detail: THERAPIES SERIES
Discharge: HOME | End: 2020-09-10
Payer: COMMERCIAL

## 2020-09-10 PROCEDURE — 97110 THERAPEUTIC EXERCISES: CPT

## 2020-09-10 PROCEDURE — 97140 MANUAL THERAPY 1/> REGIONS: CPT

## 2020-09-10 NOTE — FLOWSHEET NOTE
[facility], [address]  Phone: [phone]  Fax [phone]    Physical Therapy Treatment Note/ Progress Report:           Date:  9/10/2020    Patient Name:  Papito Yung    :  1960  MRN: [id number]  Restrictions/Precautions:    Medical/Treatment Diagnosis Information:  · Diagnosis: D39.119 (ICD-10-CM) - Status post total knee replacement, right  · Treatment Diagnosis: Right Knee Pain (M25.561); Right Knee Effusion (M25.461); Right Kneee Stiffness (M25.661); Right Lower Extremity Weakness (F41.39)  Insurance/Certification information:  PT Insurance Information: [facility]  Physician Information:  Referring Practitioner: MARIO Caputo  Has the plan of care been signed (Y/N):        [x]  Yes  []  No     Date of Patient follow up with Physician: 20      Is this a Progress Report:     [x]  Yes  []  No        If Yes:  Date Range for reporting period:  Beginnin/3/20  Endin/3/20    Progress report will be due (10 Rx or 30 days whichever is less):        Recertification will be due (POC Duration  / 90 days whichever is less): 10/26/20         Visit # Insurance Allowable Requires auth   9 [id number]    []no        [x]yes:     Functional Scale (LEFS):  · 8/3/20: 63.75% Disability (29/80)   · 9/3/20: 16.25% disability (67/80)       Latex Allergy:  [x]NO      []YES  Preferred Language for Healthcare:   [x]English       []other:      Pain level:  Low, more stiff than painful     SUBJECTIVE:  Patient reports persistent right knee stiffness. She is performing her HEP diligently but notes continued pain with ROM. OBJECTIVE:   Right Knee AROM (Post Manual Therapy): -12 // -7-94   No increased warmth, firmness, or redness in right calf.     RESTRICTIONS/PRECAUTIONS: WBAT    Exercises/Interventions:     Therapeutic Ex (23853) Volume Notes/CUES   Leg Press 6 min, 40#    Circuit coordination, kinesthetic sense, posture, motor skill, proprioception  to assist with LE, proximal hip, and core control in self care, mobility, lifting, ambulation and eccentric single leg control. NMR and Therapeutic Activities:    [] (70673 or 79577) Provided verbal/tactile cueing for activities related to improving balance, coordination, kinesthetic sense, posture, motor skill, proprioception and motor activation to allow for proper function of core, proximal hip and LE with self care and ADLs  [] (80189) Gait Re-education- Provided training and instruction to the patient for proper LE, core and proximal hip recruitment and positioning and eccentric body weight control with ambulation re-education including up and down stairs     Home Exercise Program:    [x] (37366) Reviewed/Progressed HEP activities related to strengthening, flexibility, endurance, ROM of core, proximal hip and LE for functional self-care, mobility, lifting and ambulation/stair navigation   [] (24620)Reviewed/Progressed HEP activities related to improving balance, coordination, kinesthetic sense, posture, motor skill, proprioception of core, proximal hip and LE for self care, mobility, lifting, and ambulation/stair navigation      Manual Treatments:  PROM / STM / Oscillations-Mobs:  G-I, II, III, IV (PA's, Inf., Post.)  [x] (46433) Provided manual therapy to mobilize LE, proximal hip and/or LS spine soft tissue/joints for the purpose of modulating pain, promoting relaxation,  increasing ROM, reducing/eliminating soft tissue swelling/inflammation/restriction, improving soft tissue extensibility and allowing for proper ROM for normal function with self care, mobility, lifting and ambulation. Modalities:     [] GAME READY (VASO)- for significant edema, swelling, pain control.   - 15 minutes    Charges:  Timed Code Treatment Minutes: 55   Total Treatment Minutes: 55     Time In: 5:37pm  Time Out: 6:33pm      [] EVAL (LOW) 23212 (typically

## 2020-09-17 ENCOUNTER — HOSPITAL ENCOUNTER (OUTPATIENT)
Dept: PHYSICAL THERAPY | Age: 60
Setting detail: THERAPIES SERIES
Discharge: HOME | End: 2020-09-17
Payer: COMMERCIAL

## 2020-09-17 PROCEDURE — 97110 THERAPEUTIC EXERCISES: CPT

## 2020-09-17 PROCEDURE — 97112 NEUROMUSCULAR REEDUCATION: CPT

## 2020-09-17 PROCEDURE — 97140 MANUAL THERAPY 1/> REGIONS: CPT

## 2020-09-17 NOTE — FLOWSHEET NOTE
Circuit (3x)  -Squat  -Staggered Stance Squat  -Unilateral Marching   12x  12x bilaterally  12x bilaterally All Exercises performed with UE assist on True Stretch   Step Up and Over 3x12: 6\", 8\", 8\"                                       Manual Intervention (98526) 18 min    STM to right quadriceps, hamstring, and calf     Grade III-IV inferior glides of the right patella. Grade III-IV extension mobilizations of the right knee     Manual stretching of the right hamstrings. Manual stretching of the right quadriceps                    NMR re-education (07851) 8 min CUES NEEDED   Airex Step Circuit (3x)  -Forward  -lateral   12x  12x                                       Therapeutic Activity (82589)                                   Additional time was spent explaining exercise instruction, exercise set up, and rest breaks. Patient Education:   · Updated HEP; provided new handout. Access Code: [email or web address]   URL: [email or web address]   Date: 09/17/2020   Prepared by: Tamika Hadley     Exercises  Long Sitting Quad Set - 1 sets - 3 hold - 6 Time - 3x daily - 7x weekly  Sitting Heel Slide with Towel - 1 sets - 1 sec hold - 6 minutes Time - 3x daily - 7x weekly  Seated Long Arc Quad - 12 reps - 3 sets - 1x daily - 7x weekly  Supine Straight Leg Raises - 12 reps - 3 sets - 1x daily - 7x weekly                   Sidelying Hip Abduction - 12 reps - 3 sets - 1x daily - 7x weekly  Seated Table Hamstring Stretch - 3 reps - 1 sets - 30 sec Hold - 3x daily - 7x weekly  Gastroc Stretch on Wall - 3 reps - 1 sets - 30 sec hold - 3x daily - 7x weekly  Squat with Counter Support - 12 reps - 3 sets - 1x daily - 7x weekly  Staggered Stance Squat - 12 reps - 3 sets - 1x daily - 7x weekly  Prone Quadriceps Stretch with Strap - 25 reps - 1 sets - 3 sec hold - 1x daily - 7x weekly    Therapeutic Exercise and NMR EXR  [x] (27732) Provided verbal/tactile cueing for activities related to strengthening, flexibility, endurance, ROM for improvements in LE, proximal hip, and core control with self care, mobility, lifting, ambulation.  [] (23752) Provided verbal/tactile cueing for activities related to improving balance, coordination, kinesthetic sense, posture, motor skill, proprioception  to assist with LE, proximal hip, and core control in self care, mobility, lifting, ambulation and eccentric single leg control. NMR and Therapeutic Activities:    [] (82174 or 81736) Provided verbal/tactile cueing for activities related to improving balance, coordination, kinesthetic sense, posture, motor skill, proprioception and motor activation to allow for proper function of core, proximal hip and LE with self care and ADLs  [] (32197) Gait Re-education- Provided training and instruction to the patient for proper LE, core and proximal hip recruitment and positioning and eccentric body weight control with ambulation re-education including up and down stairs     Home Exercise Program:    [x] (98580) Reviewed/Progressed HEP activities related to strengthening, flexibility, endurance, ROM of core, proximal hip and LE for functional self-care, mobility, lifting and ambulation/stair navigation   [] (71282)Reviewed/Progressed HEP activities related to improving balance, coordination, kinesthetic sense, posture, motor skill, proprioception of core, proximal hip and LE for self care, mobility, lifting, and ambulation/stair navigation      Manual Treatments:  PROM / STM / Oscillations-Mobs:  G-I, II, III, IV (PA's, Inf., Post.)  [x] (01897) Provided manual therapy to mobilize LE, proximal hip and/or LS spine soft tissue/joints for the purpose of modulating pain, promoting relaxation,  increasing ROM, reducing/eliminating soft tissue swelling/inflammation/restriction, improving soft tissue extensibility and allowing for proper ROM for normal function with self care, mobility, lifting and ambulation.      Modalities:     [] GAME READY (VASO)- for significant edema, swelling, pain control. - 15 minutes    Charges:  Timed Code Treatment Minutes: 57   Total Treatment Minutes: 57     Time In: 5:19pm  Time Out: 6:16pm      [] EVAL (LOW) 00987 (typically 20 minutes face-to-face)  [] EVAL (MOD) 19532 (typically 30 minutes face-to-face)  [] EVAL (HIGH) 13547 (typically 45 minutes face-to-face)  [] RE-EVAL     [x] DQ(77372) x 2    [] IONTO  [x] NMR (38281) x  1   [x] VASO-GameReady for 15 minutes on Low, max cold   [x] Manual (38866) x 1     [] Other:  [] TA x      [] Mech Traction (37863)  [] ES(attended) (97520)      [] ES (un) (28242):       GOALS:  Patient stated goal: Patient will be able to walk 30 minutes without restriction or onset or right knee pain. [x] Progressing: [] Met: [] Not Met: [] Adjusted    Therapist goals for Patient:   Short Term Goals: To be achieved in: 2 weeks  1. Independent in HEP and progression per patient tolerance, in order to prevent re-injury. [] Progressing: [x] Met: [] Not Met: [] Adjusted  2. Patient will have a decrease in pain to facilitate improvement in movement, function, and ADLs as indicated by Functional Deficits. [] Progressing: [x] Met: [] Not Met: [] Adjusted    Long Term Goals: To be achieved in: 12 weeks  1. Disability index score of 30% or less for the LEFS to assist with reaching prior level of function. [] Progressing: [x] Met: [] Not Met: [] Adjusted  2. Patient will demonstrate ROM on affected side equal to the unaffected side to allow for proper joint functioning as indicated by patients Functional Deficits. [x] Progressing: [] Met: [] Not Met: [] Adjusted  3. Patient will demonstrate an increase in Strength to good proximal hip strength and control, within 5lb HHD in LE to allow for proper functional mobility as indicated by patients Functional Deficits. [x] Progressing: [] Met: [] Not Met: [] Adjusted  4. Patient will return to ascend/descend stairs reciprocallyy without increased symptoms or restriction.    [] Progressing: [x] Met: [] Not Met: [] Adjusted  5. Patient will be able to perform a sit-to-stand without UE assist with no onset of right knee pain. [] Progressing: [x] Met: [] Not Met: [] Adjusted    Progression Towards Functional goals:  [x] Patient is progressing as expected towards functional goals listed. [] Progression is slowed due to complexities listed. [] Progression has been slowed due to co-morbidities. [] Plan just implemented, too soon to assess goals progression  [] Other:         Overall Progression Towards Functional goals/ Treatment Progress Update:  [x] Patient is progressing as expected towards functional goals listed. [] Progression is slowed due to complexities/Impairments listed. [] Progression has been slowed due to co-morbidities. [] Plan just implemented, too soon to assess goals progression <30days   [] Goals require adjustment due to lack of progress  [] Patient is not progressing as expected and requires additional follow up with physician  [] Other    Prognosis for POC: [x] Good [] Fair  [] Poor      Patient requires continued skilled intervention: [x] Yes  [] No    Treatment/Activity Tolerance:  [x] Patient able to complete treatment  [] Patient limited by fatigue  [] Patient limited by pain    [] Patient limited by other medical complications  [] Other:     ASSESSMENT: Patient tolerated treatment well. ROM remains the priority in PT. PT is concerned as ROM has stopped progressing. PT has notified her surgeon once about concerns with ROM to date. She is tolerated and increased volume of weightbearing activity. She requires skilled PT intervention in order to improve right knee ROM, decrease right knee swelling, increase right LE muscular endurance, and increase right LE strength in order to return to her prior level of function and activity without the onset of right knee pain.     Return to Play: (if applicable)   []  Stage 1: Intro to Strength   []  Stage 2: Return to Run

## 2020-09-21 ENCOUNTER — OFFICE VISIT (OUTPATIENT)
Dept: ORTHOPEDIC SURGERY | Age: 60
End: 2020-09-21

## 2020-09-21 VITALS — HEIGHT: 67 IN | WEIGHT: 227 LBS | BODY MASS INDEX: 35.63 KG/M2

## 2020-09-21 PROCEDURE — 99024 POSTOP FOLLOW-UP VISIT: CPT | Performed by: ORTHOPAEDIC SURGERY

## 2020-09-21 NOTE — PROGRESS NOTES
unsteady gait or progressive weakness  SKIN: Denies skin changes, delayed healing, rash, itching       PHYSICAL EXAM:    Vitals: Height 5' 7.01\" (1.702 m), weight 227 lb (103 kg). GENERAL EXAM:  · General Apparence: Patient is adequately groomed with no evidence of malnutrition. · Orientation: The patient is oriented to time, place and person. · Mood & Affect:The patient's mood and affect are appropriate       Right knee PHYSICAL EXAMINATION:  · Inspection: 1+ effusion. Minimal pretibial edema. No signs of infection or DVT. The exit the incision looks excellent. · Palpation: Under over the Pez anserine region and medial joint space. · Range of Motion: 0--98 degrees. · Strength: 5/5    · Special Tests: No instability. Negative Homans sign. · Skin:  There are no rashes, ulcerations or lesions. · There are no distal dysvascular changes     Gait & station:       Additional Examinations:              Diagnostic Testing: The following x rays were read and interpreted by myself      1. Orders   No orders of the defined types were placed in this encounter. Assessment / Treatment Plan:     1. 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 I discussed treatment options with this lady. We talked about doing a manipulation under anesthesia and she really wants to try to avoid this. I am going to give her 2 more weeks of therapy. If she is not progressing she is to need a manipulation. She understands this. 2.  We will see her again in 2 weeks. 3. I have personally performed and/or participated in the history, exam and medical decision making and agree with all pertinent clinical information. I have also reviewed and agree with the past medical, family and social history unless otherwise noted. This dictation was performed with a verbal recognition program (DRAGON) and it was checked for errors.  It is possible that there are still dictated errors within this office note. If so, please bring any errors to my attention for an addendum. All efforts were made to ensure that this office note is accurate.           Jimena Levi MD

## 2020-09-24 ENCOUNTER — HOSPITAL ENCOUNTER (OUTPATIENT)
Dept: PHYSICAL THERAPY | Age: 60
Setting detail: THERAPIES SERIES
Discharge: HOME | End: 2020-09-24
Payer: COMMERCIAL

## 2020-09-24 NOTE — FLOWSHEET NOTE
[facility],  [address]        Physical Therapy  Cancellation/No-show Note  Patient Name:  Wilberto Angulo  :  1960   Date:  2020  Cancelled visits to date: 1  No-shows to date: 0    For today's appointment patient:  X  Cancelled  ? Rescheduled appointment  ? No-show     Reason given by patient:  ?  Patient ill  ? Conflicting appointment  ? No transportation    ? Conflict with work  ? No reason given  ? Other:     Comments:  Patient arrived and authorization for further visits was needed to be obtained with [facility]. While this was being done, the patient and PT discussed her recent visit with her surgeon regarding a possible surgical manipulation of her right knee. PT reiterated that it is highly unlikely she will gain any more ROM with conservative care, but that she would need more ROM for future function. While four further visits were obtained while patient was present, both the patient and the PT agreed it would be best to save all of her remaining annual PT visits (10 remaining as of today) for after possible surgical manipulation.     Electronically signed by: Ace Hernandez PT, DPT ([place] PT License #: [id number])

## 2020-10-01 ENCOUNTER — TELEPHONE (OUTPATIENT)
Dept: PHYSICAL THERAPY | Age: 60
End: 2020-10-01

## 2020-10-01 NOTE — TELEPHONE ENCOUNTER
PT called and left a voicemail with the patient to check on whether she had been contacted by her surgeon's office regarding possibly moving forward with manipulation of her right knee.     Luis Hernández PT, DPT ([place] PT License #: [id number])

## 2020-10-05 ENCOUNTER — OFFICE VISIT (OUTPATIENT)
Dept: ORTHOPEDIC SURGERY | Age: 60
End: 2020-10-05

## 2020-10-05 VITALS — HEIGHT: 67 IN | BODY MASS INDEX: 35.63 KG/M2 | WEIGHT: 227 LBS

## 2020-10-05 PROCEDURE — 99024 POSTOP FOLLOW-UP VISIT: CPT | Performed by: ORTHOPAEDIC SURGERY

## 2020-10-05 NOTE — PROGRESS NOTES
Status post right total knee replacement 7/14/2020    History of present illness: The patient returns today after RIGHT 7/14/2020 total knee replacement. Pain control has been satisfactory with oral medications. There have been no fevers or chills. She has been struggling with range of motion especially the last month. She is interested in moving forward with a manipulation under anesthesia because she feels as though she cannot get much better range of motion on her own. Physical examination: The incision is clean, dry, and intact with no drainage or signs of infection. Range of motion is 5 degrees of extension to 95 degrees of flexion. There is expected swelling with no evidence of DVT and a negative Homans sign. Neurovascular exam is intact. Assessment/plan:  The patient has plateaued in terms of her range of motion the last few weeks. We discussed manipulation under anesthesia with arthroscopic synovectomy for arthrofibrosis. She is interested in moving forward. She understands the risks of surgery as well as the potential risk for recurrent arthrofibrosis and the importance of the physical therapy component. I estimate that she will need to be off of work for another 3 to 4 weeks post arthroscopy depending on her progress. We discussed the risks, benefits, and complications of surgery. The patient realizes that there are concerns with this surgery with respect to infection, deep vein thrombosis, neurological injury and delayed  rehabilitation. The patient realizes that there are also anesthetic concerns including cardiopulmonary issues, pulmonary issues, and even possibility of death or dystrophy. The patient voiced understanding to this as well as the normal  rehabilitation that  is involved. The patient is aware of these risks and desires to move forward with surgery.     I have personally performed and/or participated in the history, exam and medical decision making and agree with all pertinent clinical information. I have also reviewed and agree with the past medical, family and social history unless otherwise noted. This dictation was performed with a verbal recognition program (DRAGON) and it was checked for errors. It is possible that there are still dictated errors within this office note. If so, please bring any errors to my attention for an addendum. All efforts were made to ensure that this office note is accurate.           Bharti Wright MD

## 2020-10-12 ENCOUNTER — TELEPHONE (OUTPATIENT)
Dept: ORTHOPEDIC SURGERY | Age: 60
End: 2020-10-12

## 2020-10-14 ENCOUNTER — OFFICE VISIT (OUTPATIENT)
Dept: PRIMARY CARE CLINIC | Age: 60
End: 2020-10-14
Payer: COMMERCIAL

## 2020-10-14 PROCEDURE — 99211 OFF/OP EST MAY X REQ PHY/QHP: CPT | Performed by: NURSE PRACTITIONER

## 2020-10-14 NOTE — PROGRESS NOTES
Obstructive Sleep Apnea (IVET) Screening     Patient:  Elroy Dyer    YOB: 1960      Medical Record #:  [id number]                     Date:  10/14/2020     1. Are you a loud and/or regular snorer? [x]  Yes       [] No    2. Have you been observed to gasp or stop breathing during sleep? []  Yes       [x] No    3. Do you feel tired or groggy upon awakening or do you awaken with a headache?           []  Yes       [x] No    4. Are you often tired or fatigued during the wake time hours? []  Yes       [x] No    5. Do you fall asleep sitting, reading, watching TV or driving? []  Yes       [x] No    6. Do you often have problems with memory or concentration? []  Yes       [x] No    **If patient's score is ? 3 they are considered high risk for IVET. An Anesthesia provider will evaluate the patient and develop a plan of care the day of surgery. Note:  If the patient's BMI is more than 35 kg m¯² , has neck circumference > 40 cm, and/or high blood pressure the risk is greater (© American Sleep Apnea Association, 2006).

## 2020-10-14 NOTE — PATIENT INSTRUCTIONS

## 2020-10-14 NOTE — PROGRESS NOTES
Mary Moran received a viral test for COVID-19. They were educated on isolation and quarantine as appropriate. For any symptoms, they were directed to seek care from their PCP, given contact information to establish with a doctor, directed to an urgent care or the emergency room.

## 2020-10-15 LAB — SARS-COV-2, PCR: NOT DETECTED

## 2020-10-15 NOTE — RESULT ENCOUNTER NOTE

## 2020-10-19 ENCOUNTER — ANESTHESIA EVENT (OUTPATIENT)
Dept: OPERATING ROOM | Age: 60
End: 2020-10-19
Payer: COMMERCIAL

## 2020-10-20 ENCOUNTER — HOSPITAL ENCOUNTER (OUTPATIENT)
Age: 60
Setting detail: OUTPATIENT SURGERY
Discharge: HOME | End: 2020-10-20
Attending: ORTHOPAEDIC SURGERY | Admitting: ORTHOPAEDIC SURGERY
Payer: COMMERCIAL

## 2020-10-20 ENCOUNTER — ANESTHESIA (OUTPATIENT)
Dept: OPERATING ROOM | Age: 60
End: 2020-10-20
Payer: COMMERCIAL

## 2020-10-20 VITALS
SYSTOLIC BLOOD PRESSURE: 144 MMHG | TEMPERATURE: 97.4 F | OXYGEN SATURATION: 98 % | BODY MASS INDEX: 35.63 KG/M2 | RESPIRATION RATE: 16 BRPM | WEIGHT: 227 LBS | HEIGHT: 67 IN | HEART RATE: 65 BPM | DIASTOLIC BLOOD PRESSURE: 73 MMHG

## 2020-10-20 VITALS
SYSTOLIC BLOOD PRESSURE: 140 MMHG | DIASTOLIC BLOOD PRESSURE: 64 MMHG | RESPIRATION RATE: 1 BRPM | OXYGEN SATURATION: 100 %

## 2020-10-20 PROCEDURE — 2500000003 HC RX 250 WO HCPCS: Performed by: ORTHOPAEDIC SURGERY

## 2020-10-20 PROCEDURE — 2580000003 HC RX 258: Performed by: ANESTHESIOLOGY

## 2020-10-20 PROCEDURE — 6360000002 HC RX W HCPCS: Performed by: ANESTHESIOLOGY

## 2020-10-20 PROCEDURE — 6360000002 HC RX W HCPCS: Performed by: ORTHOPAEDIC SURGERY

## 2020-10-20 PROCEDURE — 3700000000 HC ANESTHESIA ATTENDED CARE: Performed by: ORTHOPAEDIC SURGERY

## 2020-10-20 PROCEDURE — 2500000003 HC RX 250 WO HCPCS: Performed by: NURSE ANESTHETIST, CERTIFIED REGISTERED

## 2020-10-20 PROCEDURE — 3600000014 HC SURGERY LEVEL 4 ADDTL 15MIN: Performed by: ORTHOPAEDIC SURGERY

## 2020-10-20 PROCEDURE — 7100000001 HC PACU RECOVERY - ADDTL 15 MIN: Performed by: ORTHOPAEDIC SURGERY

## 2020-10-20 PROCEDURE — 2720000010 HC SURG SUPPLY STERILE: Performed by: ORTHOPAEDIC SURGERY

## 2020-10-20 PROCEDURE — 2709999900 HC NON-CHARGEABLE SUPPLY: Performed by: ORTHOPAEDIC SURGERY

## 2020-10-20 PROCEDURE — 2580000003 HC RX 258: Performed by: ORTHOPAEDIC SURGERY

## 2020-10-20 PROCEDURE — 3600000004 HC SURGERY LEVEL 4 BASE: Performed by: ORTHOPAEDIC SURGERY

## 2020-10-20 PROCEDURE — 6360000002 HC RX W HCPCS: Performed by: NURSE ANESTHETIST, CERTIFIED REGISTERED

## 2020-10-20 PROCEDURE — 7100000000 HC PACU RECOVERY - FIRST 15 MIN: Performed by: ORTHOPAEDIC SURGERY

## 2020-10-20 PROCEDURE — 3700000001 HC ADD 15 MINUTES (ANESTHESIA): Performed by: ORTHOPAEDIC SURGERY

## 2020-10-20 PROCEDURE — 6370000000 HC RX 637 (ALT 250 FOR IP): Performed by: ANESTHESIOLOGY

## 2020-10-20 RX ORDER — DEXAMETHASONE SODIUM PHOSPHATE 10 MG/ML
INJECTION INTRAMUSCULAR; INTRAVENOUS PRN
Status: DISCONTINUED | OUTPATIENT
Start: 2020-10-20 | End: 2020-10-20 | Stop reason: SDUPTHER

## 2020-10-20 RX ORDER — SODIUM CHLORIDE, SODIUM LACTATE, POTASSIUM CHLORIDE, CALCIUM CHLORIDE 600; 310; 30; 20 MG/100ML; MG/100ML; MG/100ML; MG/100ML
INJECTION, SOLUTION INTRAVENOUS CONTINUOUS
Status: DISCONTINUED | OUTPATIENT
Start: 2020-10-20 | End: 2020-10-20 | Stop reason: HOSPADM

## 2020-10-20 RX ORDER — PROPOFOL 10 MG/ML
INJECTION, EMULSION INTRAVENOUS PRN
Status: DISCONTINUED | OUTPATIENT
Start: 2020-10-20 | End: 2020-10-20 | Stop reason: SDUPTHER

## 2020-10-20 RX ORDER — LIDOCAINE HYDROCHLORIDE 20 MG/ML
INJECTION, SOLUTION EPIDURAL; INFILTRATION; INTRACAUDAL; PERINEURAL PRN
Status: DISCONTINUED | OUTPATIENT
Start: 2020-10-20 | End: 2020-10-20 | Stop reason: SDUPTHER

## 2020-10-20 RX ORDER — KETOROLAC TROMETHAMINE 30 MG/ML
INJECTION, SOLUTION INTRAMUSCULAR; INTRAVENOUS PRN
Status: DISCONTINUED | OUTPATIENT
Start: 2020-10-20 | End: 2020-10-20 | Stop reason: SDUPTHER

## 2020-10-20 RX ORDER — OXYCODONE HYDROCHLORIDE AND ACETAMINOPHEN 5; 325 MG/1; MG/1
1 TABLET ORAL PRN
Status: DISCONTINUED | OUTPATIENT
Start: 2020-10-20 | End: 2020-10-20 | Stop reason: HOSPADM

## 2020-10-20 RX ORDER — MEPERIDINE HYDROCHLORIDE 50 MG/ML
12.5 INJECTION INTRAMUSCULAR; INTRAVENOUS; SUBCUTANEOUS EVERY 5 MIN PRN
Status: DISCONTINUED | OUTPATIENT
Start: 2020-10-20 | End: 2020-10-20 | Stop reason: HOSPADM

## 2020-10-20 RX ORDER — LABETALOL HYDROCHLORIDE 5 MG/ML
5 INJECTION, SOLUTION INTRAVENOUS EVERY 10 MIN PRN
Status: DISCONTINUED | OUTPATIENT
Start: 2020-10-20 | End: 2020-10-20 | Stop reason: HOSPADM

## 2020-10-20 RX ORDER — SODIUM CHLORIDE 0.9 % (FLUSH) 0.9 %
10 SYRINGE (ML) INJECTION EVERY 12 HOURS SCHEDULED
Status: DISCONTINUED | OUTPATIENT
Start: 2020-10-20 | End: 2020-10-20 | Stop reason: HOSPADM

## 2020-10-20 RX ORDER — MORPHINE SULFATE 2 MG/ML
2 INJECTION, SOLUTION INTRAMUSCULAR; INTRAVENOUS EVERY 5 MIN PRN
Status: DISCONTINUED | OUTPATIENT
Start: 2020-10-20 | End: 2020-10-20 | Stop reason: HOSPADM

## 2020-10-20 RX ORDER — APREPITANT 40 MG/1
40 CAPSULE ORAL ONCE
Status: COMPLETED | OUTPATIENT
Start: 2020-10-20 | End: 2020-10-20

## 2020-10-20 RX ORDER — OXYCODONE HYDROCHLORIDE AND ACETAMINOPHEN 5; 325 MG/1; MG/1
2 TABLET ORAL PRN
Status: DISCONTINUED | OUTPATIENT
Start: 2020-10-20 | End: 2020-10-20 | Stop reason: HOSPADM

## 2020-10-20 RX ORDER — MORPHINE SULFATE 2 MG/ML
1 INJECTION, SOLUTION INTRAMUSCULAR; INTRAVENOUS EVERY 5 MIN PRN
Status: DISCONTINUED | OUTPATIENT
Start: 2020-10-20 | End: 2020-10-20 | Stop reason: HOSPADM

## 2020-10-20 RX ORDER — LIDOCAINE HYDROCHLORIDE 10 MG/ML
0.3 INJECTION, SOLUTION EPIDURAL; INFILTRATION; INTRACAUDAL; PERINEURAL
Status: DISCONTINUED | OUTPATIENT
Start: 2020-10-20 | End: 2020-10-20 | Stop reason: HOSPADM

## 2020-10-20 RX ORDER — PROMETHAZINE HYDROCHLORIDE 25 MG/1
25 TABLET ORAL 4 TIMES DAILY PRN
Qty: 20 TABLET | Refills: 0 | Status: SHIPPED | OUTPATIENT
Start: 2020-10-20 | End: 2020-10-27

## 2020-10-20 RX ORDER — HYDROCODONE BITARTRATE AND ACETAMINOPHEN 5; 325 MG/1; MG/1
1 TABLET ORAL EVERY 6 HOURS PRN
Qty: 28 TABLET | Refills: 0 | Status: SHIPPED | OUTPATIENT
Start: 2020-10-20 | End: 2020-10-27

## 2020-10-20 RX ORDER — ASPIRIN 325 MG
325 TABLET ORAL DAILY
Qty: 14 TABLET | Refills: 0 | Status: SHIPPED | OUTPATIENT
Start: 2020-10-20 | End: 2022-08-24

## 2020-10-20 RX ORDER — PROMETHAZINE HYDROCHLORIDE 25 MG/ML
6.25 INJECTION, SOLUTION INTRAMUSCULAR; INTRAVENOUS
Status: DISCONTINUED | OUTPATIENT
Start: 2020-10-20 | End: 2020-10-20 | Stop reason: HOSPADM

## 2020-10-20 RX ORDER — HYDRALAZINE HYDROCHLORIDE 20 MG/ML
5 INJECTION INTRAMUSCULAR; INTRAVENOUS EVERY 10 MIN PRN
Status: DISCONTINUED | OUTPATIENT
Start: 2020-10-20 | End: 2020-10-20 | Stop reason: HOSPADM

## 2020-10-20 RX ORDER — ONDANSETRON 2 MG/ML
4 INJECTION INTRAMUSCULAR; INTRAVENOUS PRN
Status: DISCONTINUED | OUTPATIENT
Start: 2020-10-20 | End: 2020-10-20 | Stop reason: HOSPADM

## 2020-10-20 RX ORDER — ONDANSETRON 2 MG/ML
INJECTION INTRAMUSCULAR; INTRAVENOUS PRN
Status: DISCONTINUED | OUTPATIENT
Start: 2020-10-20 | End: 2020-10-20 | Stop reason: SDUPTHER

## 2020-10-20 RX ORDER — DIPHENHYDRAMINE HYDROCHLORIDE 50 MG/ML
12.5 INJECTION INTRAMUSCULAR; INTRAVENOUS
Status: DISCONTINUED | OUTPATIENT
Start: 2020-10-20 | End: 2020-10-20 | Stop reason: HOSPADM

## 2020-10-20 RX ORDER — SCOLOPAMINE TRANSDERMAL SYSTEM 1 MG/1
1 PATCH, EXTENDED RELEASE TRANSDERMAL
Status: DISCONTINUED | OUTPATIENT
Start: 2020-10-20 | End: 2020-10-20 | Stop reason: HOSPADM

## 2020-10-20 RX ORDER — FENTANYL CITRATE 50 UG/ML
INJECTION, SOLUTION INTRAMUSCULAR; INTRAVENOUS PRN
Status: DISCONTINUED | OUTPATIENT
Start: 2020-10-20 | End: 2020-10-20 | Stop reason: SDUPTHER

## 2020-10-20 RX ORDER — BUPIVACAINE HYDROCHLORIDE 2.5 MG/ML
INJECTION, SOLUTION INFILTRATION; PERINEURAL PRN
Status: DISCONTINUED | OUTPATIENT
Start: 2020-10-20 | End: 2020-10-20 | Stop reason: ALTCHOICE

## 2020-10-20 RX ORDER — SODIUM CHLORIDE 0.9 % (FLUSH) 0.9 %
10 SYRINGE (ML) INJECTION PRN
Status: DISCONTINUED | OUTPATIENT
Start: 2020-10-20 | End: 2020-10-20 | Stop reason: HOSPADM

## 2020-10-20 RX ADMIN — CEFAZOLIN SODIUM 2 G: 10 INJECTION, POWDER, FOR SOLUTION INTRAVENOUS at 12:53

## 2020-10-20 RX ADMIN — FENTANYL CITRATE 25 MCG: 50 INJECTION INTRAMUSCULAR; INTRAVENOUS at 13:26

## 2020-10-20 RX ADMIN — FENTANYL CITRATE 50 MCG: 50 INJECTION INTRAMUSCULAR; INTRAVENOUS at 13:01

## 2020-10-20 RX ADMIN — PROPOFOL 100 MG: 10 INJECTION, EMULSION INTRAVENOUS at 12:53

## 2020-10-20 RX ADMIN — LIDOCAINE HYDROCHLORIDE 60 MG: 20 INJECTION, SOLUTION EPIDURAL; INFILTRATION; INTRACAUDAL; PERINEURAL at 12:53

## 2020-10-20 RX ADMIN — DEXAMETHASONE SODIUM PHOSPHATE 10 MG: 10 INJECTION INTRAMUSCULAR; INTRAVENOUS at 13:01

## 2020-10-20 RX ADMIN — FENTANYL CITRATE 25 MCG: 50 INJECTION INTRAMUSCULAR; INTRAVENOUS at 12:59

## 2020-10-20 RX ADMIN — SODIUM CHLORIDE, POTASSIUM CHLORIDE, SODIUM LACTATE AND CALCIUM CHLORIDE: 600; 310; 30; 20 INJECTION, SOLUTION INTRAVENOUS at 11:34

## 2020-10-20 RX ADMIN — ONDANSETRON 4 MG: 2 INJECTION INTRAMUSCULAR; INTRAVENOUS at 13:01

## 2020-10-20 RX ADMIN — KETOROLAC TROMETHAMINE 15 MG: 30 INJECTION, SOLUTION INTRAMUSCULAR; INTRAVENOUS at 13:25

## 2020-10-20 RX ADMIN — APREPITANT 40 MG: 40 CAPSULE ORAL at 11:46

## 2020-10-20 RX ADMIN — PROPOFOL 50 MG: 10 INJECTION, EMULSION INTRAVENOUS at 13:00

## 2020-10-20 ASSESSMENT — PULMONARY FUNCTION TESTS
PIF_VALUE: 7
PIF_VALUE: 5
PIF_VALUE: 7
PIF_VALUE: 6
PIF_VALUE: 7
PIF_VALUE: 12
PIF_VALUE: 7
PIF_VALUE: 19
PIF_VALUE: 0
PIF_VALUE: 0
PIF_VALUE: 7
PIF_VALUE: 1
PIF_VALUE: 7
PIF_VALUE: 10
PIF_VALUE: 7
PIF_VALUE: 6
PIF_VALUE: 7
PIF_VALUE: 6
PIF_VALUE: 7
PIF_VALUE: 7
PIF_VALUE: 6
PIF_VALUE: 0
PIF_VALUE: 6
PIF_VALUE: 7
PIF_VALUE: 6
PIF_VALUE: 14
PIF_VALUE: 6
PIF_VALUE: 0
PIF_VALUE: 7
PIF_VALUE: 6
PIF_VALUE: 6
PIF_VALUE: 7

## 2020-10-20 ASSESSMENT — PAIN DESCRIPTION - DESCRIPTORS: DESCRIPTORS: ACHING

## 2020-10-20 ASSESSMENT — PAIN - FUNCTIONAL ASSESSMENT: PAIN_FUNCTIONAL_ASSESSMENT: 0-10

## 2020-10-20 NOTE — BRIEF OP NOTE
Brief Postoperative Note      Patient: Eldon Sarmiento  YOB: 1960  MRN: [id number]    Date of Procedure: 10/20/2020    Pre-Op Diagnosis: ARTHROFIBROSIS STATUS POST RIGHT TOTAL KNEE REPLACEMENT    Post-Op Diagnosis: Same       Procedure(s):  EXAM UNDER ANESTHESIA VIDEO ARTHROSCOPY RIGHT KNEE, MANIPULATION, SYNOVECTOMY    Surgeon(s):  Harris Hassan MD    Assistant:  Surgical Assistant: Rogelio Martin;  Kamlesh Segura  Physician Assistant: MARIO Arredondo    Anesthesia: General    Estimated Blood Loss (mL): less than 50     Complications: None    Specimens:   * No specimens in log *    Implants:  * No implants in log *      Drains: * No LDAs found *    Findings: arthrofibrosis    Electronically signed by MARIO Mcpherson on 10/20/2020 at 2:47 PM

## 2020-10-20 NOTE — ANESTHESIA POSTPROCEDURE EVALUATION
Department of Anesthesiology  Postprocedure Note    Patient: Alondra Zheng  MRN: [id number]  YOB: 1960  Date of evaluation: 10/20/2020  Time:  4:31 PM     Procedure Summary     Date:  10/20/20 Room / Location:  [address]    Anesthesia Start:  1250 Anesthesia Stop:  2174    Procedure:  EXAM UNDER ANESTHESIA VIDEO ARTHROSCOPY RIGHT KNEE, MANIPULATION, SYNOVECTOMY (Right Knee) Diagnosis:       Arthrofibrosis of knee joint, right      Status post right unicompartmental knee replacement      (ARTHROFIBROSIS STATUS POST RIGHT TOTAL KNEE REPLACEMENT)    Surgeon:  Taniya Li MD Responsible Provider:  To Medina MD    Anesthesia Type:  general ASA Status:  3          Anesthesia Type: general    Manan Phase I: Manan Score: 6    Manan Phase II: Manan Score: 10    Last vitals: Reviewed and per EMR flowsheets.    Vitals:    10/20/20 1339 10/20/20 1344 10/20/20 1355 10/20/20 1403   BP: (!) 124/58 134/70 127/79 (!) 144/73   Pulse: 73 73 71 65   Resp: 12 16 16 16   Temp:   97.4 °F (36.3 °C)    TempSrc:       SpO2: 97% 97% 97% 98%   Weight:       Height:           Anesthesia Post Evaluation    Patient location during evaluation: bedside  Patient participation: complete - patient participated  Level of consciousness: awake and alert  Airway patency: patent  Nausea & Vomiting: no nausea  Complications: no  Cardiovascular status: hemodynamically stable  Respiratory status: acceptable  Hydration status: euvolemic

## 2020-10-20 NOTE — ANESTHESIA PRE PROCEDURE
Department of Anesthesiology  Preprocedure Note       Name:  Stephane Newman   Age:  63 y.o.  :  1960                                          MRN:  [id number]         Date:  10/20/2020      Surgeon: Doyle Lujan):  Bob Vuong MD    Procedure: Procedure(s):  EXAM UNDER ANESTHESIA VIDEO ARTHROSCOPY RIGHT KNEE, MANIPULATION, SYNOVECTOMY    Medications prior to admission:   Prior to Admission medications    Medication Sig Start Date End Date Taking? Authorizing Provider   acetaminophen (TYLENOL) 325 MG tablet Take 2 tablets by mouth every 6 hours as needed for Pain 7/15/20   MARIO Valero       Current medications:    Current Facility-Administered Medications   Medication Dose Route Frequency Provider Last Rate Last Dose    ceFAZolin (ANCEF) 2 g in dextrose 5 % 100 mL IVPB  2 g Intravenous On Call to Yaniv Davenport MD        lactated ringers infusion   Intravenous Continuous Sonali Moss MD        sodium chloride flush 0.9 % injection 10 mL  10 mL Intravenous 2 times per day Sonali Moss MD        sodium chloride flush 0.9 % injection 10 mL  10 mL Intravenous PRN Sonali Moss MD        lidocaine PF 1 % injection 0.3 mL  0.3 mL Intradermal Once PRN Sonali Moss MD           Allergies: Allergies   Allergen Reactions    Oxycodone Nausea And Vomiting       Problem List:    Patient Active Problem List   Diagnosis Code    Status post total right knee replacement Z96.651    S/P total knee arthroplasty, right S53.136       Past Medical History:  History reviewed. No pertinent past medical history.     Past Surgical History:        Procedure Laterality Date    APPENDECTOMY      TONSILLECTOMY      TOTAL KNEE ARTHROPLASTY Right 2020    RIGHT TOTAL KNEE REPLACEMENT      MALHOTRA & NEPHEW performed by Bob Vuong MD at [facility] [address] History:    Social History     Tobacco Use    Smoking status: Former Smoker     Types: Cigarettes     Last attempt to quit: 2004     Years since quittin.2  Smokeless tobacco: Never Used   Substance Use Topics    Alcohol use: Never     Frequency: Never                                Counseling given: Not Answered      Vital Signs (Current):   Vitals:    10/14/20 1010 10/20/20 1122   BP:  (!) 158/87   Pulse:  74   Resp:  16   Temp:  96.8 °F (36 °C)   TempSrc:  Temporal   SpO2:  100%   Weight: 227 lb (103 kg) 227 lb (103 kg)   Height: 5' 7\" (1.702 m) 5' 7\" (1.702 m)                                              BP Readings from Last 3 Encounters:   10/20/20 (!) 158/87   07/15/20 (!) 100/59   07/14/20 91/60       NPO Status: Time of last liquid consumption: 1900                        Time of last solid consumption: 1830                        Date of last liquid consumption: 10/19/20                        Date of last solid food consumption: 10/19/20    BMI:   Wt Readings from Last 3 Encounters:   10/20/20 227 lb (103 kg)   10/05/20 227 lb (103 kg)   09/21/20 227 lb (103 kg)     Body mass index is 35.55 kg/m². CBC:   Lab Results   Component Value Date    WBC 6.7 07/15/2020    RBC 3.80 07/15/2020    HGB 11.1 07/15/2020    HCT 33.5 07/15/2020    MCV 88.1 07/15/2020    RDW 13.9 07/15/2020     07/15/2020       CMP:   Lab Results   Component Value Date     07/15/2020    K 3.7 07/15/2020     07/15/2020    CO2 24 07/15/2020    BUN 11 07/15/2020    CREATININE <0.5 07/15/2020    GFRAA >60 07/15/2020    LABGLOM >60 07/15/2020    GLUCOSE 128 07/15/2020    CALCIUM 8.3 07/15/2020       POC Tests: No results for input(s): POCGLU, POCNA, POCK, POCCL, POCBUN, POCHEMO, POCHCT in the last 72 hours.     Coags: No results found for: PROTIME, INR, APTT    HCG (If Applicable): No results found for: PREGTESTUR, PREGSERUM, HCG, HCGQUANT     ABGs: No results found for: PHART, PO2ART, WTE2OHC, CMR1RYV, BEART, M5RIIDAU     Type & Screen (If Applicable):  No results found for: LABABO, LABRH    Drug/Infectious Status (If Applicable):  No results found for: HIV, HEPCAB    COVID-19 Screening (If Applicable):   Lab Results   Component Value Date    COVID19 Not Detected 10/14/2020         Anesthesia Evaluation  Patient summary reviewed   history of anesthetic complications: PONV. Airway: Mallampati: II  TM distance: >3 FB   Neck ROM: full  Mouth opening: > = 3 FB Dental: normal exam         Pulmonary:Negative Pulmonary ROS and normal exam  breath sounds clear to auscultation                             Cardiovascular:Negative CV ROS  Exercise tolerance: good (>4 METS),           Rhythm: regular  Rate: normal                    Neuro/Psych:   Negative Neuro/Psych ROS              GI/Hepatic/Renal:   (+) GERD: well controlled,           Endo/Other: Negative Endo/Other ROS                    Abdominal:           Vascular: negative vascular ROS. Pre-Operative Diagnosis: Arthrofibrosis of knee joint, right [M24.661]; Status post right unicompartmental knee replacement [Z96.651]    63 y.o.   BMI:  Body mass index is 35.55 kg/m².      Vitals:    10/14/20 1010 10/20/20 1122   BP:  (!) 158/87   Pulse:  74   Resp:  16   Temp:  96.8 °F (36 °C)   TempSrc:  Temporal   SpO2:  100%   Weight: 227 lb (103 kg) 227 lb (103 kg)   Height: 5' 7\" (1.702 m) 5' 7\" (1.702 m)       Allergies   Allergen Reactions    Oxycodone Nausea And Vomiting       Social History     Tobacco Use    Smoking status: Former Smoker     Types: Cigarettes     Last attempt to quit: 2004     Years since quittin.2    Smokeless tobacco: Never Used   Substance Use Topics    Alcohol use: Never     Frequency: Never       LABS:    CBC  Lab Results   Component Value Date/Time    WBC 6.7 07/15/2020 05:39 AM    HGB 11.1 (L) 07/15/2020 05:39 AM    HCT 33.5 (L) 07/15/2020 05:39 AM     07/15/2020 05:39 AM     RENAL  Lab Results   Component Value Date/Time     07/15/2020 05:39 AM    K 3.7 07/15/2020 05:39 AM     07/15/2020 05:39 AM    CO2 24 07/15/2020 05:39 AM    BUN 11 07/15/2020 05:39 AM    CREATININE <0.5 (L) 07/15/2020 05:39 AM    GLUCOSE 128 (H) 07/15/2020 05:39 AM     COAGS  No results found for: PROTIME, INR, APTT          Anesthesia Plan      general     ASA 3     (I discussed with the patient the risks and benefits of PIV, anesthesia, IV Narcotics, PACU. All questions were answered the patient agrees with the plan and wishes to proceed)  Induction: intravenous.                           Essence Little MD   10/20/2020

## 2020-10-20 NOTE — H&P
I have reviewed the history and physical and examined the patient and find no relevant changes. I have reviewed with the patient and/or family the risks, benefits, and alternatives to the procedure. Patient being given increased dosage/quantity of opoid pain medication in excess of [employer] guidelines which noted a 30 MED daily of opioids due to the fact that he/she has undergone major orthopaedic surgery as outlined in rule 4731-11-13. Dosages and further duration of the pain medication will be re-evaluated at her post op visit in 2 weeks. Patient was educated on dosing expectations and limits of prescribing as a result of the new [facility] Board rules enacted August 31, 2017. Please also note that this is not the initial opoid prescription issued to this patient but a continuation of medication utilized during the hospital admission as noted in the medical record. [place]ARRS report has also been utilized to screen for any abuse history or suspicious activity as outlined in [place]. All efforts have been taken to prevent abuse potential and misuse of opioid medications including education, screening, and close clinical follow up. Controlled Substance Monitoring:    Acute and Chronic Pain Monitoring:   RX Monitoring 10/20/2020   Periodic Controlled Substance Monitoring No signs of potential drug abuse or diversion identified.

## 2020-10-21 ENCOUNTER — HOSPITAL ENCOUNTER (OUTPATIENT)
Dept: PHYSICAL THERAPY | Age: 60
Setting detail: THERAPIES SERIES
Discharge: HOME | End: 2020-10-21
Payer: COMMERCIAL

## 2020-10-21 PROCEDURE — 97110 THERAPEUTIC EXERCISES: CPT

## 2020-10-21 PROCEDURE — 97164 PT RE-EVAL EST PLAN CARE: CPT

## 2020-10-21 PROCEDURE — 97140 MANUAL THERAPY 1/> REGIONS: CPT

## 2020-10-21 NOTE — PLAN OF CARE
[place] [address] and [facility], [address] [place] [address]  Phone: [phone]  Fax [phone]    Physical Therapy Re-Certification Plan of Care/MD UPDATE      Dear Qi Barriga  ,    We had the pleasure of treating the following patient for physical therapy services at [address] [place] [address]. A summary of our findings can be found in the updated assessment below. This includes our plan of care. If you have any questions or concerns regarding these findings, please do not hesitate to contact me at the office phone number checked above. Thank you for the referral.     Physician Signature:________________________________Date:__________________  By signing above (or electronic signature), therapists plan is approved by physician    Date Range Of Visits: 8/3/20 to 10/21/20  Total Visits to Date: 6  Overall Response to Treatment:    []Patient is responding well to treatment and improvement is noted with regards  to goals   []Patient should continue to improve in reasonable time if they continue HEP   []Patient has plateaued and is no longer responding to skilled PT intervention    []Patient is getting worse and would benefit from return to referring MD   []Patient unable to adhere to initial POC   [x]Other:  Patient is displaying improved active and passive ROM post manipulation and arthroscopy on 10/20/20. Date:  10/21/2020    Patient Name:  Jhon Cuello    :  1960  MRN: [id number]  Restrictions/Precautions:    Medical/Treatment Diagnosis Information:  · Diagnosis: Z75.158 (ICD-10-CM) - Status post total knee replacement, right  · Treatment Diagnosis: Right Knee Pain (M25.561); Right Knee Effusion (M25.461); Right Kneee Stiffness (M25.661);  Right Lower Extremity Weakness (L34.29)  Insurance/Certification information:  PT Insurance Information: BCBS  Physician Information:  Referring Practitioner: Roxanne Pascual Lizeth [place]  Has the plan of care been signed (Y/N):        [x]  Yes  []  No     Date of Patient follow up with Physician: 10/23/20      Is this a Progress Report:     [x]  Yes  []  No        If Yes:  Date Range for reporting period:  Beginnin/3/20  Ending: 10/21/20    Progress report will be due (10 Rx or 30 days whichever is less): 74      Recertification will be due (POC Duration  / 90 days whichever is less):  20        Visit # Insurance Allowable Requires auth   11 [id number]    []no        [x]yes:     Functional Scale (LEFS):  · 8/3/20: 63.75% Disability (29/80)   · 9/3/20: 16.25% disability (67/80)  · 10/21/20/20: 32.5% disability (54/80)       Latex Allergy:  [x]NO      []YES  Preferred Language for Healthcare:   [x]English       []other:      Pain level:  Low to moderate     SUBJECTIVE:  Patient reports not issues since her manipulation under anesthesia and knee arthroscopy yesterday (10/20/20). She notes she slept the best she has since March last night. She is already feeling much better. OBJECTIVE:   Right Knee AROM: -8-88  · Right Knee PROM: -8-90 // -4-112   No increased warmth, firmness, or redness in right calf.  Surgical sites are clean, no redness, no discharge.  Gait: Decreased weightbearing on right; inadequate hip and knee flexion in loading response phase of gait; no terminal stance on right.  Mild right knee swelling. RESTRICTIONS/PRECAUTIONS: WBAT    Exercises/Interventions:     -After removing the patient's surgical dressing, three bandaids were placed over each of the three suture sites. Therapeutic Ex (96657) Volume Notes/CUES   Circuit (5x)  -Seated Hamstring Stretch  -Standing Gastroc Stretch   30\"  30\"    Prone Quad Stretch with Strap 5x30\"                             HEP Education 3 min         Manual Intervention (51984) 35 min    STM to right quadriceps and hamstring. Grade III-IV anterior and posterior glides of the right tibia.      Grade III-IV inferior glides of the right patella. Grade III-IV extension mobilizations of the right knee     Manual stretching of the right hamstrings. Manual stretching of the right quadriceps     PROM: Right Knee Flexion and Extension               NMR re-education (41738)  CUES NEEDED                                           Therapeutic Activity (90524)                                   Additional time was spent explaining exercise instruction, exercise set up, and rest breaks. Patient Education:   · Updated HEP; provided new handout. · Educated on [facility] program and it's usefulness as a concomitant intervention with PT. Access Code: [email or web address]   URL: [email or web address]   Date: 10/21/2020   Prepared by: Jaz Guajardo     Exercises  Long Sitting Quad Set - 1 sets - 3 hold - 6 Time - 8x daily - 7x weekly  Sitting Heel Slide with Towel - 1 sets - 1 sec hold - 6 minutes Time - 8x daily - 7x weekly  Seated Table Hamstring Stretch - 5 reps - 1 sets - 30 sec Hold - 8x daily - 7x weekly  Gastroc Stretch on Wall - 5 reps - 1 sets - 30 sec hold - 8x daily - 7x weekly  Prone Quadriceps Stretch with Strap - 5 reps - 1 sets - 30 sec hold - 8x daily - 7x weekly    Therapeutic Exercise and NMR EXR  [x] (87621) Provided verbal/tactile cueing for activities related to strengthening, flexibility, endurance, ROM for improvements in LE, proximal hip, and core control with self care, mobility, lifting, ambulation.  [] (75966) Provided verbal/tactile cueing for activities related to improving balance, coordination, kinesthetic sense, posture, motor skill, proprioception  to assist with LE, proximal hip, and core control in self care, mobility, lifting, ambulation and eccentric single leg control.      NMR and Therapeutic Activities:    [] (16918 or 94869) Provided verbal/tactile cueing for activities related to improving balance, coordination, kinesthetic sense, posture, motor skill, proprioception and motor activation to allow for proper function of core, proximal hip and LE with self care and ADLs  [] (26670) Gait Re-education- Provided training and instruction to the patient for proper LE, core and proximal hip recruitment and positioning and eccentric body weight control with ambulation re-education including up and down stairs     Home Exercise Program:    [x] (35303) Reviewed/Progressed HEP activities related to strengthening, flexibility, endurance, ROM of core, proximal hip and LE for functional self-care, mobility, lifting and ambulation/stair navigation   [] (91935)Reviewed/Progressed HEP activities related to improving balance, coordination, kinesthetic sense, posture, motor skill, proprioception of core, proximal hip and LE for self care, mobility, lifting, and ambulation/stair navigation      Manual Treatments:  PROM / STM / Oscillations-Mobs:  G-I, II, III, IV (PA's, Inf., Post.)  [x] (02755) Provided manual therapy to mobilize LE, proximal hip and/or LS spine soft tissue/joints for the purpose of modulating pain, promoting relaxation,  increasing ROM, reducing/eliminating soft tissue swelling/inflammation/restriction, improving soft tissue extensibility and allowing for proper ROM for normal function with self care, mobility, lifting and ambulation. Modalities:     [] GAME READY (VASO)- for significant edema, swelling, pain control.   - 15 minutes    Charges:  Timed Code Treatment Minutes: 53   Total Treatment Minutes: 77     Time In: 9:01am  Time Out: 10:18am      [] EVAL (LOW) 10838 (typically 20 minutes face-to-face)  [] EVAL (MOD) 11770 (typically 30 minutes face-to-face)  [] EVAL (HIGH) 81363 (typically 45 minutes face-to-face)  [x] RE-EVAL (9:01am-9:25am)    [x] DP(26662) x 2  (10:00am-10:18am)     [] IONTO  [] NMR (03873) x        [] VASO-GameReady for 15 minutes on Low, max cold   [x] Manual (02405) x 2 (9:25am-10:00am)     [] Other:  [] TA x         [] Mech Traction (40704)  [] ES(attended) (30241)        [] ES (un) (65175):       GOALS:  Patient stated goal: Patient will be able to walk 30 minutes without restriction or onset or right knee pain. [x] Progressing: [] Met: [] Not Met: [] Adjusted    Therapist goals for Patient:   Short Term Goals: To be achieved in: 2 weeks  1. Independent in HEP and progression per patient tolerance, in order to prevent re-injury. [] Progressing: [x] Met: [] Not Met: [] Adjusted  2. Patient will have a decrease in pain to facilitate improvement in movement, function, and ADLs as indicated by Functional Deficits. [] Progressing: [x] Met: [] Not Met: [] Adjusted    Long Term Goals: To be achieved in: 12 weeks  1. Disability index score of 30% or less for the LEFS to assist with reaching prior level of function. [x] Progressing: [] Met: [] Not Met: [] Adjusted  2. Patient will demonstrate ROM on affected side equal to the unaffected side to allow for proper joint functioning as indicated by patients Functional Deficits. [x] Progressing: [] Met: [] Not Met: [] Adjusted  3. Patient will demonstrate an increase in Strength to good proximal hip strength and control, within 5lb HHD in LE to allow for proper functional mobility as indicated by patients Functional Deficits. [x] Progressing: [] Met: [] Not Met: [] Adjusted  4. Patient will return to ascend/descend stairs reciprocallyy without increased symptoms or restriction. [x] Progressing: [] Met: [] Not Met: [] Adjusted  5. Patient will be able to perform a sit-to-stand without UE assist with no onset of right knee pain. [x] Progressing: [] Met: [] Not Met: [] Adjusted   6. Patient will be able to perform kneel bilaterally with UE assist with no onset of right knee pain. [x] Progressing: [] Met: [] Not Met: [] Adjusted   7. Patient will be able to get in and out of a car without UE assist with no onset of right knee pain. [x] Progressing: [] Met: [] Not Met: [] Adjusted  8.  Patient will be able to drive for 30 minutes without UE order to progress her as far as possible toward achieving her functional goals. Return to Play: (if applicable)   []  Stage 1: Intro to Strength   []  Stage 2: Return to Run and Strength   []  Stage 3: Return to Jump and Strength   []  Stage 4: Dynamic Strength and Agility   []  Stage 5: Sport Specific Training     []  Ready to Return to Play, Meets All Above Stages   []  Not Ready for Return to Sports   Comments:                               PLAN: 1-2x per week for 8 weeks (beginning 10/21/20, ending 12/16/20)  [x] Continue per plan of care [] Alter current plan (see comments above)  [] Plan of care initiated [] Hold pending MD visit [] Discharge      Electronically signed by: Dinesh Valdez PT, DPT ([place] PT License #: [id number])    Note: If patient does not return for scheduled/ recommended follow up visits, this note will serve as a discharge from care along with most recent update on progress.

## 2020-10-21 NOTE — OP NOTE
[address] [address]                                OPERATIVE REPORT    PATIENT NAME: Gage Leigh                       :        1960  MED REC NO:   [id number]                          ROOM:  ACCOUNT NO:   [de-identified]                           ADMIT DATE: 10/20/2020  PROVIDER:     Bobbi Wetzel MD    DATE OF PROCEDURE:  10/20/2020    SERVICE:  Orthopedic Surgery. SURGEON:  Irina Marin MD    FIRST ASSISTANTMont Sicard, SA    PREOPERATIVE DIAGNOSIS:  Arthrofibrosis, right knee, status post  previous total knee replacement. POSTOPERATIVE DIAGNOSIS:  Arthrofibrosis, right knee, status post  previous total knee replacement. OPERATION PERFORMED:  1. Examination under anesthesia and video arthroscopy, right knee. 2.  Arthroscopic surgery involving the right knee with medial and  lateral releases utilizing a combination of 5.0 full-radius resector and  the [employer].  3.  Synovectomy medially, laterally, and resection of adhesions in the  suprapatellar pouch as well - tricompartmental.  4.  Manipulation under anesthesia. INDICATIONS FOR SURGERY:  The patient is a 42-year-old woman who had a  total knee replacement done about three months ago. Unfortunately she  struggled with significant range of motion, was not functional at about  85 to 90 degrees. She presents today for arthroscopic surgery in  attempts to get her improved range of motion. We discussed the need for  aggressive physical therapy thereafter to try to get her better motion  as well as concerns regarding infection, deep vein thrombosis, pulmonary  embolism, arthrofibrosis, delayed rehabilitation, damage to the extensor  mechanism or fracture, etc.  All questions were answered. I did sign  her leg in the preprocedure holding area.     DETAILS OF SURGERY:  The patient was taken to the operating room and  placed on the operating table in supine position. General anesthesia  was induced and maintained without difficulty. Examination demonstrated  range of motion of essentially 5 to 85 beam with the patient asleep. She had very tight right patella as well both medially and laterally. With gentle manipulation, I was able to take the patient to about 3 to  125 degrees. There was palpable lysis of adhesions noted. At this point, the patient was likewise positioned, prepped and draped  in a routine fashion for arthroscopic surgery. Routine arthroscopic  portals were established. At this point, a comprehensive synovectomy and  release of adhesions was performed. The patient had extensive adhesions  across the anterior aspect of the knee up the medial gutter, lateral  gutter and the suprapatellar region. Using a combination of the full-radius resector and the [employer], releases were done. I had to release the retinaculum both  medially and laterally to free up the patella and then a comprehensive  synovectomy and removal of adhesions was done up in the suprapatellar  pouch as well as across the anterior aspect of the knee. The prosthesis itself and the implant itself was fine and stable. There  were adhesions running from the intercondylar notch region to the fat  pad as well which was of course was taken down. Ultimately I was very happy with the release. The patient had easy  range of motion from 0 to 110 degrees which was the most I could bend  her knee in the arthroscopy briseno. She had already went to 125 degrees  during the manipulation. Everything freed up quite nicely. At this point, everything was copiously irrigated and the instruments  were removed. 30 mL of Marcaine was instilled in the portals and nylon  was placed in the portals. Dry sterile dressings were applied.     The patient went to the recovery room postprocedure stable.      ebl 0  Loren Padilla MD    D: 10/20/2020 15:50:41       T: 10/20/2020 19:00:11     [email or web address]  Job#: [id number]     Doc#: [id number]    CC:

## 2020-10-22 ENCOUNTER — HOSPITAL ENCOUNTER (OUTPATIENT)
Dept: PHYSICAL THERAPY | Age: 60
Setting detail: THERAPIES SERIES
Discharge: HOME | End: 2020-10-22
Payer: COMMERCIAL

## 2020-10-22 PROCEDURE — 97140 MANUAL THERAPY 1/> REGIONS: CPT

## 2020-10-22 NOTE — FLOWSHEET NOTE
[facility], [address]  Phone: [phone]  Fax [phone]    Physical Therapy Treatment Note/ Progress Report:         Date:  10/22/2020    Patient Name:  Sigrid Gonsalves    :  1960  MRN: [id number]  Restrictions/Precautions:    Medical/Treatment Diagnosis Information:  · Diagnosis: I49.249 (ICD-10-CM) - Status post total knee replacement, right  · Treatment Diagnosis: Right Knee Pain (M25.561); Right Knee Effusion (M25.461); Right Kneee Stiffness (M25.661); Right Lower Extremity Weakness (G67.75)  Insurance/Certification information:  PT Insurance Information: [facility]  Physician Information:  Referring Practitioner: MARIO Bullock  Has the plan of care been signed (Y/N):        [x]  Yes  []  No     Date of Patient follow up with Physician: 10/23/20      Is this a Progress Report:     [x]  Yes  []  No        If Yes:  Date Range for reporting period:  Beginnin/3/20  Ending: 10/21/20    Progress report will be due (10 Rx or 30 days whichever is less):       Recertification will be due (POC Duration  / 90 days whichever is less):  20        Visit # Insurance Allowable Requires auth   12 $0CP-20PT    []no        [x]yes:     Functional Scale (LEFS):  · 8/3/20: 63.75% Disability (29/80)   · 9/3/20: 16.25% disability (67/80)  · 10/21/20/20: 32.5% disability (54/80)       Latex Allergy:  [x]NO      []YES  Preferred Language for Healthcare:   [x]English       []other:      Pain level:  Low to moderate     SUBJECTIVE:  Patient reports she was able to perform her HEP 5x yesterday after PT.    OBJECTIVE:   Right Knee AROM: -15-90  · Right Knee PROM: -15-94 // -8-109   No increased warmth, firmness, or redness in right calf.  Surgical sites are clean, no redness, no discharge.    Gait: Decreased weightbearing on right; inadequate hip and knee flexion in loading response phase of gait; no terminal stance on right.  Mild right knee swelling. RESTRICTIONS/PRECAUTIONS: WBAT    Exercises/Interventions:     Therapeutic Ex (40867) Volume Notes/CUES   Circuit (5x)  -Seated Hamstring Stretch  -Standing Gastroc Stretch   30\"  30\" Performed on treatment table with PT available as need at neighboring table 6ft away. Prone Quad Stretch with Strap 5x30\" Performed on treatment table with PT available as need at neighboring table 6ft away. Manual Intervention (41279) 40 min    STM to right quadriceps and hamstring. Grade III-IV anterior and posterior glides of the right tibia. Grade III-IV inferior glides of the right patella. Grade III-IV extension mobilizations of the right knee     Manual stretching of the right hamstrings. Manual stretching of the right quadriceps     PROM: Right Knee Flexion and Extension               NMR re-education (86647)  CUES NEEDED                                           Therapeutic Activity (82319)                                   Additional time was spent explaining exercise instruction, exercise set up, and rest breaks. Patient Education:   · Continue current HEP. · Do not leave right knee resting in mid-range ROM. Rest it at either end-range flexion or propped up in end range extension. Access Code: [email or web address]   URL: [email or web address]   Date: 10/21/2020   Prepared by: Melba Donovan     Exercises  Long Sitting Quad Set - 1 sets - 3 hold - 6 Time - 8x daily - 7x weekly  Sitting Heel Slide with Towel - 1 sets - 1 sec hold - 6 minutes Time - 8x daily - 7x weekly  Seated Table Hamstring Stretch - 5 reps - 1 sets - 30 sec Hold - 8x daily - 7x weekly  Gastroc Stretch on Wall - 5 reps - 1 sets - 30 sec hold - 8x daily - 7x weekly  Prone Quadriceps Stretch with Strap - 5 reps - 1 sets - 30 sec hold - 8x daily - 7x weekly    Therapeutic Exercise and NMR EXR  [x] (72908) Provided verbal/tactile cueing for activities related to strengthening, flexibility, endurance, ROM for improvements in LE, proximal hip, and core control with self care, mobility, lifting, ambulation.  [] (50988) Provided verbal/tactile cueing for activities related to improving balance, coordination, kinesthetic sense, posture, motor skill, proprioception  to assist with LE, proximal hip, and core control in self care, mobility, lifting, ambulation and eccentric single leg control. NMR and Therapeutic Activities:    [] (85296 or 77418) Provided verbal/tactile cueing for activities related to improving balance, coordination, kinesthetic sense, posture, motor skill, proprioception and motor activation to allow for proper function of core, proximal hip and LE with self care and ADLs  [] (25353) Gait Re-education- Provided training and instruction to the patient for proper LE, core and proximal hip recruitment and positioning and eccentric body weight control with ambulation re-education including up and down stairs     Home Exercise Program:    [x] (57370) Reviewed/Progressed HEP activities related to strengthening, flexibility, endurance, ROM of core, proximal hip and LE for functional self-care, mobility, lifting and ambulation/stair navigation   [] (64120)Reviewed/Progressed HEP activities related to improving balance, coordination, kinesthetic sense, posture, motor skill, proprioception of core, proximal hip and LE for self care, mobility, lifting, and ambulation/stair navigation      Manual Treatments:  PROM / STM / Oscillations-Mobs:  G-I, II, III, IV (PA's, Inf., Post.)  [x] (21013) Provided manual therapy to mobilize LE, proximal hip and/or LS spine soft tissue/joints for the purpose of modulating pain, promoting relaxation,  increasing ROM, reducing/eliminating soft tissue swelling/inflammation/restriction, improving soft tissue extensibility and allowing for proper ROM for normal function with self care, mobility, lifting and ambulation.      Modalities: [] GAME READY (VASO)- for significant edema, swelling, pain control. - 15 minutes    Charges:  Timed Code Treatment Minutes: 44   Total Treatment Minutes: 61     Time In: 10:46m  Time Out: 11:47am      [] EVAL (LOW) 51106 (typically 20 minutes face-to-face)  [] EVAL (MOD) 30890 (typically 30 minutes face-to-face)  [] EVAL (HIGH) 58995 (typically 45 minutes face-to-face)  [] RE-EVAL     [x] AX(76254) x 0       [] IONTO  [] NMR (72848) x        [] VASO-GameReady for 15 minutes on Low, max cold   [x] Manual (74743) x 3      [] Other:  [] TA x         [] Mech Traction (49772)  [] ES(attended) (56778)        [] ES (un) (94434):       GOALS:  Patient stated goal: Patient will be able to walk 30 minutes without restriction or onset or right knee pain. [x] Progressing: [] Met: [] Not Met: [] Adjusted    Therapist goals for Patient:   Short Term Goals: To be achieved in: 2 weeks  1. Independent in HEP and progression per patient tolerance, in order to prevent re-injury. [] Progressing: [x] Met: [] Not Met: [] Adjusted  2. Patient will have a decrease in pain to facilitate improvement in movement, function, and ADLs as indicated by Functional Deficits. [] Progressing: [x] Met: [] Not Met: [] Adjusted    Long Term Goals: To be achieved in: 12 weeks  1. Disability index score of 30% or less for the LEFS to assist with reaching prior level of function. [x] Progressing: [] Met: [] Not Met: [] Adjusted  2. Patient will demonstrate ROM on affected side equal to the unaffected side to allow for proper joint functioning as indicated by patients Functional Deficits. [x] Progressing: [] Met: [] Not Met: [] Adjusted  3. Patient will demonstrate an increase in Strength to good proximal hip strength and control, within 5lb HHD in LE to allow for proper functional mobility as indicated by patients Functional Deficits. [x] Progressing: [] Met: [] Not Met: [] Adjusted  4.  Patient will return to ascend/descend stairs reciprocallyy without increased symptoms or restriction. [x] Progressing: [] Met: [] Not Met: [] Adjusted  5. Patient will be able to perform a sit-to-stand without UE assist with no onset of right knee pain. [x] Progressing: [] Met: [] Not Met: [] Adjusted   6. Patient will be able to perform kneel bilaterally with UE assist with no onset of right knee pain. [x] Progressing: [] Met: [] Not Met: [] Adjusted   7. Patient will be able to get in and out of a car without UE assist with no onset of right knee pain. [x] Progressing: [] Met: [] Not Met: [] Adjusted  8. Patient will be able to drive for 30 minutes without UE assist with no onset of right knee pain. [x] Progressing: [] Met: [] Not Met: [] Adjusted    Progression Towards Functional goals:  [x] Patient is progressing as expected towards functional goals listed. [] Progression is slowed due to complexities listed. [] Progression has been slowed due to co-morbidities. [] Plan just implemented, too soon to assess goals progression  [] Other:         Overall Progression Towards Functional goals/ Treatment Progress Update:  [x] Patient is progressing as expected towards functional goals listed. [] Progression is slowed due to complexities/Impairments listed. [] Progression has been slowed due to co-morbidities. [] Plan just implemented, too soon to assess goals progression <30days   [] Goals require adjustment due to lack of progress  [] Patient is not progressing as expected and requires additional follow up with physician  [] Other    Prognosis for POC: [x] Good [] Fair  [] Poor      Patient requires continued skilled intervention: [x] Yes  [] No    Treatment/Activity Tolerance:  [x] Patient able to complete treatment  [] Patient limited by fatigue  [] Patient limited by pain    [] Patient limited by other medical complications  [] Other:     ASSESSMENT: Patient tolerated treatment well.  She requires further skilled PT intervention in order to improve right knee ROM, decrease right knee swelling, increase right LE muscular endurance, and increase right LE strength in order to return to her prior level of function and activity without the onset of right knee pain. Return to Play: (if applicable)   []  Stage 1: Intro to Strength   []  Stage 2: Return to Run and Strength   []  Stage 3: Return to Jump and Strength   []  Stage 4: Dynamic Strength and Agility   []  Stage 5: Sport Specific Training     []  Ready to Return to Play, Meets All Above Stages   []  Not Ready for Return to Sports   Comments:                               PLAN: 1-2x per week for 8 weeks (beginning 10/21/20, ending 12/16/20)  [x] Continue per plan of care [] Alter current plan (see comments above)  [] Plan of care initiated [] Hold pending MD visit [] Discharge      Electronically signed by: Adán Perez PT, DPT ([place] PT License #: [id number])    Note: If patient does not return for scheduled/ recommended follow up visits, this note will serve as a discharge from care along with most recent update on progress.

## 2020-10-23 ENCOUNTER — HOSPITAL ENCOUNTER (OUTPATIENT)
Dept: PHYSICAL THERAPY | Age: 60
Setting detail: THERAPIES SERIES
Discharge: HOME | End: 2020-10-23
Payer: COMMERCIAL

## 2020-10-23 ENCOUNTER — OFFICE VISIT (OUTPATIENT)
Dept: ORTHOPEDIC SURGERY | Age: 60
End: 2020-10-23

## 2020-10-23 VITALS — WEIGHT: 227 LBS | BODY MASS INDEX: 35.63 KG/M2 | HEIGHT: 67 IN

## 2020-10-23 PROCEDURE — 99024 POSTOP FOLLOW-UP VISIT: CPT | Performed by: PHYSICIAN ASSISTANT

## 2020-10-23 PROCEDURE — 97140 MANUAL THERAPY 1/> REGIONS: CPT

## 2020-10-23 NOTE — FLOWSHEET NOTE
[facility], [address] [place] [address]  Phone: [phone]  Fax [phone]    Physical Therapy Treatment Note/ Progress Report:         Date:  10/23/2020    Patient Name:  Radha Young    :  1960  MRN: [id number]  Restrictions/Precautions:    Medical/Treatment Diagnosis Information:  · Diagnosis: R14.559 (ICD-10-CM) - Status post total knee replacement, right  · Treatment Diagnosis: Right Knee Pain (M25.561); Right Knee Effusion (M25.461); Right Kneee Stiffness (M25.661); Right Lower Extremity Weakness (Z68.75)  Insurance/Certification information:  PT Insurance Information: [place]  Physician Information:  Referring Practitioner: MARIO Schumacher  Has the plan of care been signed (Y/N):        [x]  Yes  []  No     Date of Patient follow up with Physician: 10/23/20      Is this a Progress Report:     [x]  Yes  []  No        If Yes:  Date Range for reporting period:  Beginnin/3/20  Ending: 10/21/20    Progress report will be due (10 Rx or 30 days whichever is less):       Recertification will be due (POC Duration  / 90 days whichever is less):  20        Visit # Insurance Allowable Requires auth   13 $0CP-20PT    []no        [x]yes:     Functional Scale (LEFS):  · 8/3/20: 63.75% Disability (29/80)   · 9/3/20: 16.25% disability (67/80)  · 10/21/20/20: 32.5% disability (54/80)       Latex Allergy:  [x]NO      []YES  Preferred Language for Healthcare:   [x]English       []other:      Pain level:  Low to moderate     SUBJECTIVE:  Patient reports she was able to perform her HEP 6x yesterday after PT. She saw her surgical PA-C this morning. Surgical findings were reviewed. Bandages were removed and she was instructed that she not longer needed to cover the surgical port sites and could shower if she wished.     OBJECTIVE:   Right Knee AROM (pre-tx):   · Right Knee Passive Flexion: 100 // 110  · Right Kne Passive Extension (post-tx): -5   No increased warmth, firmness, or redness in right calf.  Surgical sites display no redness. Inferior-medial port site had a very small amount of oily discharge during PT, so all port sites were covered with bandaids.  Gait: Decreased weightbearing on right; inadequate hip and knee flexion in loading response phase of gait; no terminal stance on right.  Mild right knee swelling. RESTRICTIONS/PRECAUTIONS: WBAT    Exercises/Interventions:     Therapeutic Ex (73199) Volume Notes/CUES   Circuit (5x)  -Seated Hamstring Stretch  -Standing Gastroc Stretch   30\"  30\" Performed on treatment table with PT available as need at neighboring table 6ft away. Prone Quad Stretch with Strap 5x30\" Performed on treatment table with PT available as need at neighboring table 6ft away. Manual Intervention (28463) 39 min    STM to right quadriceps and hamstring. Grade III-IV anterior and posterior glides of the right tibia. Grade III-IV inferior glides of the right patella. Grade III-IV extension mobilizations of the right knee     Manual stretching of the right hamstrings. Manual stretching of the right quadriceps     PROM: Right Knee Flexion and Extension               NMR re-education (78956)  CUES NEEDED                                           Therapeutic Activity (87874)                                   Additional time was spent explaining exercise instruction, exercise set up, and rest breaks. Patient Education:   · Continue current HEP. · Do not leave right knee resting in mid-range ROM. Rest it at either end-range flexion or propped up in end range extension. Access Code: [email or web address]   URL: [email or web address]   Date: 10/21/2020   Prepared by: Dinesh Valdez     Exercises  Long Sitting Quad Set - 1 sets - 3 hold - 6 Time - 8x daily - 7x weekly  Sitting Heel Slide with Towel - 1 sets - 1 sec hold - 6 minutes Time - 8x daily - 7x weekly  Seated Table Hamstring Stretch - 5 reps - 1 sets - 30 sec Hold - 8x daily - 7x weekly  Gastroc Stretch on Wall - 5 reps - 1 sets - 30 sec hold - 8x daily - 7x weekly  Prone Quadriceps Stretch with Strap - 5 reps - 1 sets - 30 sec hold - 8x daily - 7x weekly    Therapeutic Exercise and NMR EXR  [x] (53336) Provided verbal/tactile cueing for activities related to strengthening, flexibility, endurance, ROM for improvements in LE, proximal hip, and core control with self care, mobility, lifting, ambulation.  [] (23917) Provided verbal/tactile cueing for activities related to improving balance, coordination, kinesthetic sense, posture, motor skill, proprioception  to assist with LE, proximal hip, and core control in self care, mobility, lifting, ambulation and eccentric single leg control.      NMR and Therapeutic Activities:    [] (57860 or 88973) Provided verbal/tactile cueing for activities related to improving balance, coordination, kinesthetic sense, posture, motor skill, proprioception and motor activation to allow for proper function of core, proximal hip and LE with self care and ADLs  [] (69075) Gait Re-education- Provided training and instruction to the patient for proper LE, core and proximal hip recruitment and positioning and eccentric body weight control with ambulation re-education including up and down stairs     Home Exercise Program:    [x] (96268) Reviewed/Progressed HEP activities related to strengthening, flexibility, endurance, ROM of core, proximal hip and LE for functional self-care, mobility, lifting and ambulation/stair navigation   [] (42681)Reviewed/Progressed HEP activities related to improving balance, coordination, kinesthetic sense, posture, motor skill, proprioception of core, proximal hip and LE for self care, mobility, lifting, and ambulation/stair navigation      Manual Treatments:  PROM / STM / Oscillations-Mobs:  G-I, II, III, IV (PA's, Inf., Post.)  [x] (12684) Provided manual therapy to mobilize LE, proximal hip and/or LS spine soft tissue/joints for the purpose of modulating pain, promoting relaxation,  increasing ROM, reducing/eliminating soft tissue swelling/inflammation/restriction, improving soft tissue extensibility and allowing for proper ROM for normal function with self care, mobility, lifting and ambulation. Modalities:     [] GAME READY (VASO)- for significant edema, swelling, pain control. - 15 minutes    Charges:  Timed Code Treatment Minutes: 44   Total Treatment Minutes: 62     Time In: 12:46pm  Time Out: 1:48pm      [] EVAL (LOW) 28082 (typically 20 minutes face-to-face)  [] EVAL (MOD) 47082 (typically 30 minutes face-to-face)  [] EVAL (HIGH) 23556 (typically 45 minutes face-to-face)  [] RE-EVAL     [x] TM(04591) x 0       [] IONTO  [] NMR (82042) x        [] VASO-GameReady for 15 minutes on Low, max cold   [x] Manual (03686) x 3      [] Other:  [] TA x         [] Mech Traction (04189)  [] ES(attended) (89510)        [] ES (un) (09444):       GOALS:  Patient stated goal: Patient will be able to walk 30 minutes without restriction or onset or right knee pain. [x] Progressing: [] Met: [] Not Met: [] Adjusted    Therapist goals for Patient:   Short Term Goals: To be achieved in: 2 weeks  1. Independent in HEP and progression per patient tolerance, in order to prevent re-injury. [] Progressing: [x] Met: [] Not Met: [] Adjusted  2. Patient will have a decrease in pain to facilitate improvement in movement, function, and ADLs as indicated by Functional Deficits. [] Progressing: [x] Met: [] Not Met: [] Adjusted    Long Term Goals: To be achieved in: 12 weeks  1. Disability index score of 30% or less for the LEFS to assist with reaching prior level of function. [x] Progressing: [] Met: [] Not Met: [] Adjusted  2.  Patient will demonstrate ROM on affected side equal to the unaffected side to allow for proper joint functioning as indicated by patients Functional Poor      Patient requires continued skilled intervention: [x] Yes  [] No    Treatment/Activity Tolerance:  [x] Patient able to complete treatment  [] Patient limited by fatigue  [] Patient limited by pain    [] Patient limited by other medical complications  [] Other:     ASSESSMENT: Patient tolerated treatment well. Her ROM gains continue to be hard to attain, but still remain at pre-surgical levelsShe requires further skilled PT intervention in order to improve right knee ROM, decrease right knee swelling, increase right LE muscular endurance, and increase right LE strength in order to return to her prior level of function and activity without the onset of right knee pain. Return to Play: (if applicable)   []  Stage 1: Intro to Strength   []  Stage 2: Return to Run and Strength   []  Stage 3: Return to Jump and Strength   []  Stage 4: Dynamic Strength and Agility   []  Stage 5: Sport Specific Training     []  Ready to Return to Play, Meets All Above Stages   []  Not Ready for Return to Sports   Comments:                               PLAN: 1-2x per week for 8 weeks (beginning 10/21/20, ending 12/16/20)  [x] Continue per plan of care [] Alter current plan (see comments above)  [] Plan of care initiated [] Hold pending MD visit [] Discharge      Electronically signed by: Jenniffer Lopes PT, DPT ([place] PT License #: [id number])    Note: If patient does not return for scheduled/ recommended follow up visits, this note will serve as a discharge from care along with most recent update on progress.

## 2020-10-23 NOTE — PROGRESS NOTES
Chief Complaint   Patient presents with    Post-Op Check     RIGHT KNEE SCOPE/ [address] 10/20/20     PREOPERATIVE DIAGNOSIS:  Arthrofibrosis, right knee, status post  previous total knee replacement.     POSTOPERATIVE DIAGNOSIS:  Arthrofibrosis, right knee, status post  previous total knee replacement.     OPERATION PERFORMED:  1. Examination under anesthesia and video arthroscopy, right knee. 2.  Arthroscopic surgery involving the right knee with medial and  lateral releases utilizing a combination of 5.0 full-radius resector and  the [employer] WEREWOLF.  3.  Synovectomy medially, laterally, and resection of adhesions in the  suprapatellar pouch as well - tricompartmental.  4.  Manipulation under anesthesia. History of present illness: The patient returns today for their first postoperative visit after knee arthroscopy. Pain control has been satisfactory with oral medications. There have been no fevers or chills. Had several visits with physical therapy and has been working aggressively on range of motion. Physical examination:   Inspection reveals expected swelling. Incisions are clean, dry, and intact. No signs of infection. Range of motion limited by pain and swelling. Range of motion today is approximately 0 to 110 degrees. This is much improved since preoperatively. There is no calf pain or signs of DVT with a negative Homans sign. Assessment/plan:  The patient is doing well after knee arthroscopy. Surgical findings were reviewed today. Discussed with her the importance of continued aggressive work with range of motion. She has some concerns on if her insurance will continue to pay for the therapy but has discussed other out-of-pocket options with her physical therapists if her insurance denies these. I discussed the importance of continued frequent physical therapy as much as possible over the next several weeks.     We will see her back again at the end of next week to remove her sutures. Jenna Julian, [facility]    This dictation was performed with a verbal recognition program [facility] ) and it was checked for errors. It is possible that there are still dictated errors within this office note. If so, please bring any errors to my attention for an addendum. All efforts were made to ensure that this office note is accurate.

## 2020-10-26 ENCOUNTER — HOSPITAL ENCOUNTER (OUTPATIENT)
Dept: PHYSICAL THERAPY | Age: 60
Discharge: HOME | End: 2020-10-26

## 2020-10-26 ENCOUNTER — HOSPITAL ENCOUNTER (OUTPATIENT)
Dept: PHYSICAL THERAPY | Age: 60
Setting detail: THERAPIES SERIES
Discharge: HOME | End: 2020-10-26
Payer: COMMERCIAL

## 2020-10-26 PROCEDURE — 9990000029 HC GAP PACKAGE

## 2020-10-26 PROCEDURE — 97140 MANUAL THERAPY 1/> REGIONS: CPT

## 2020-10-26 NOTE — FLOWSHEET NOTE
[facility],  [address]  Phone: [phone]  Fax [phone]                                            Lower Extremity Daily Performance Training Note  Date:  10/26/2020    Patient Name:  Cassidy Mckenzie    :  1960  MRN: [id number]  Restrictions/Precautions: DEnies any other ortho issues  Medical/Treatment Diagnosis Information:   ·  R TKR 2020 - R Manip -10-  ·  ADLs - Walk without cane    Physician Information:   Addy Chang     Visit Number:  paid 210 on 10-    Subjective: Pt states that she had a manipulation ;last week. Just wants to at this time get her ROM back and to eventually be able to walk without the use of her cane. Objective:  Observation:   ROM -10      Exercises:  Exercise/Equipment 10-   EZ Stretch 7' Flexion  (89)   SLB at Cage 5x10\"   Mini Squats at Cage x10       Leg Press Bilat 60# 2x10   Leg Press Ecc R 40# 2x10   Leg Curl Bilat   0-4 20# 2x10   Leg Ext MA Isos  60-45-30 (F-0-1) 10 x5\" each       NIGEL - TKE - R   0-3 30# 15x5\"   NIGEL ABD R   0 30# 2x10                                       CP 10'     Other Therapeutic Activities:     Home Exercise Program: Continue with pt exercises    Patient Education:    Reviewed the need for her to do LLLD stretch in both flexion and extension. Discussed good vs bad sore. Assessment:  [x] Patient tolerated treatment well [] Patient limited by fatigue  [] Patient limited by pain  [] Patient limited by other medical complications  [] Other:     Prognosis: [x] Good [] Fair  [] Poor    Patient Requires Follow-up: [x] Yes  [] No    Plan:   [] Continue per plan of care [] Alter current plan (see comments)  [x] Plan of care initiated [] Hold pending MD visit [] Discharge    Electronically signed by:  IRCKY Oliveira ATC     Tx was performed by MIKIE as a part of the [facility] program following PT's recommendations/guidance.

## 2020-10-26 NOTE — FLOWSHEET NOTE
[facility], [address]  Phone: [phone]  Fax [phone]    Physical Therapy Treatment Note/ Progress Report:         Date:  10/26/2020    Patient Name:  Belkys Buck    :  1960  MRN: [id number]  Restrictions/Precautions:    Medical/Treatment Diagnosis Information:  · Diagnosis: L09.872 (ICD-10-CM) - Status post total knee replacement, right  · Treatment Diagnosis: Right Knee Pain (M25.561); Right Knee Effusion (M25.461); Right Kneee Stiffness (M25.661); Right Lower Extremity Weakness (Z51.02)  Insurance/Certification information:  PT Insurance Information: [facility]  Physician Information:  Referring Practitioner: MARIO Braun  Has the plan of care been signed (Y/N):        [x]  Yes  []  No     Date of Patient follow up with Physician: 10/23/20      Is this a Progress Report:     [x]  Yes  []  No        If Yes:  Date Range for reporting period:  Beginning: 10/21/20  Ending: 10/26/20    Progress report will be due (10 Rx or 30 days whichever is less):       Recertification will be due (POC Duration  / 90 days whichever is less):  20        Visit # Insurance Allowable Requires auth   14 $0CP-20PT    []no        [x]yes:     Functional Scale (LEFS):  · 8/3/20: 63.75% Disability (29/80)   · 9/3/20: 16.25% disability (67/80)  · 10/21/20: 32.5% disability (54/80)  · 10/26/20: 22.5% disability (62/80)       Latex Allergy:  [x]NO      []YES  Preferred Language for Healthcare:   [x]English       []other:      Pain level:  2-3/10     SUBJECTIVE:  Patient reports she is still sleeping better. She notes she can ascend/descend stairs reciprocally without pain. She still has pain getting into and out of the car. She is showering and changing the bandaids over her sutures each time. She has not noticed ay discharge from any of her three ports sites. She performed her HEP 7x on Saturday and 8x yesterday. with Towel - 1 sets - 1 sec hold - 6 minutes Time - 8x daily - 7x weekly  Seated Table Hamstring Stretch - 5 reps - 1 sets - 30 sec Hold - 8x daily - 7x weekly  Gastroc Stretch on Wall - 5 reps - 1 sets - 30 sec hold - 8x daily - 7x weekly  Prone Quadriceps Stretch with Strap - 5 reps - 1 sets - 30 sec hold - 8x daily - 7x weekly    Therapeutic Exercise and NMR EXR  [x] (89230) Provided verbal/tactile cueing for activities related to strengthening, flexibility, endurance, ROM for improvements in LE, proximal hip, and core control with self care, mobility, lifting, ambulation.  [] (89280) Provided verbal/tactile cueing for activities related to improving balance, coordination, kinesthetic sense, posture, motor skill, proprioception  to assist with LE, proximal hip, and core control in self care, mobility, lifting, ambulation and eccentric single leg control.      NMR and Therapeutic Activities:    [] (36559 or 68280) Provided verbal/tactile cueing for activities related to improving balance, coordination, kinesthetic sense, posture, motor skill, proprioception and motor activation to allow for proper function of core, proximal hip and LE with self care and ADLs  [] (24295) Gait Re-education- Provided training and instruction to the patient for proper LE, core and proximal hip recruitment and positioning and eccentric body weight control with ambulation re-education including up and down stairs     Home Exercise Program:    [x] (32506) Reviewed/Progressed HEP activities related to strengthening, flexibility, endurance, ROM of core, proximal hip and LE for functional self-care, mobility, lifting and ambulation/stair navigation   [] (96659)Reviewed/Progressed HEP activities related to improving balance, coordination, kinesthetic sense, posture, motor skill, proprioception of core, proximal hip and LE for self care, mobility, lifting, and ambulation/stair navigation      Manual Treatments:  PROM / STM / Oscillations-Mobs: proper joint functioning as indicated by patients Functional Deficits. [x] Progressing: [] Met: [] Not Met: [] Adjusted  3. Patient will demonstrate an increase in Strength to good proximal hip strength and control, within 5lb HHD in LE to allow for proper functional mobility as indicated by patients Functional Deficits. [x] Progressing: [] Met: [] Not Met: [] Adjusted  4. Patient will return to ascend/descend stairs reciprocallyy without increased symptoms or restriction. [] Progressing: [x] Met: [] Not Met: [] Adjusted  5. Patient will be able to perform a sit-to-stand without UE assist with no onset of right knee pain. [] Progressing: [x] Met: [] Not Met: [] Adjusted   6. Patient will be able to perform kneel bilaterally with UE assist with no onset of right knee pain. [x] Progressing: [] Met: [] Not Met: [] Adjusted   7. Patient will be able to get in and out of a car without UE assist with no onset of right knee pain. [x] Progressing: [] Met: [] Not Met: [] Adjusted  8. Patient will be able to drive for 30 minutes without UE assist with no onset of right knee pain. [x] Progressing: [] Met: [] Not Met: [] Adjusted    Progression Towards Functional goals:  [x] Patient is progressing as expected towards functional goals listed. [] Progression is slowed due to complexities listed. [] Progression has been slowed due to co-morbidities. [] Plan just implemented, too soon to assess goals progression  [] Other:         Overall Progression Towards Functional goals/ Treatment Progress Update:  [x] Patient is progressing as expected towards functional goals listed. [] Progression is slowed due to complexities/Impairments listed. [] Progression has been slowed due to co-morbidities.   [] Plan just implemented, too soon to assess goals progression <30days   [] Goals require adjustment due to lack of progress  [] Patient is not progressing as expected and requires additional follow up with physician  []

## 2020-10-27 ENCOUNTER — TELEPHONE (OUTPATIENT)
Dept: PHYSICAL THERAPY | Age: 60
End: 2020-10-27

## 2020-10-27 ENCOUNTER — HOSPITAL ENCOUNTER (OUTPATIENT)
Dept: PHYSICAL THERAPY | Age: 60
Setting detail: THERAPIES SERIES
Discharge: HOME | End: 2020-10-27
Payer: COMMERCIAL

## 2020-10-27 PROCEDURE — 97110 THERAPEUTIC EXERCISES: CPT

## 2020-10-27 PROCEDURE — 97140 MANUAL THERAPY 1/> REGIONS: CPT

## 2020-10-27 NOTE — FLOWSHEET NOTE
[facility], [address]  Phone: [phone]  Fax [phone]    Physical Therapy Treatment Note/ Progress Report:         Date:  10/27/2020    Patient Name:  Randy Evans    :  1960  MRN: [id number]  Restrictions/Precautions:    Medical/Treatment Diagnosis Information:  · Diagnosis: V57.719 (ICD-10-CM) - Status post total knee replacement, right  · Treatment Diagnosis: Right Knee Pain (M25.561); Right Knee Effusion (M25.461); Right Kneee Stiffness (M25.661); Right Lower Extremity Weakness (Q79.82)  Insurance/Certification information:  PT Insurance Information: [facility]  Physician Information:  Referring Practitioner: MARIO Aaron  Has the plan of care been signed (Y/N):        [x]  Yes  []  No     Date of Patient follow up with Physician: 10/23/20      Is this a Progress Report:     [x]  Yes  []  No        If Yes:  Date Range for reporting period:  Beginning: 10/21/20  Ending: 10/26/20    Progress report will be due (10 Rx or 30 days whichever is less):       Recertification will be due (POC Duration  / 90 days whichever is less):  20        Visit # Insurance Allowable Requires auth   15 $0CP-20PT    []no        [x]yes:     Functional Scale (LEFS):  · 8/3/20: 63.75% Disability (29/80)   · 9/3/20: 16.25% disability (67/80)  · 10/21/20: 32.5% disability (54/80)  · 10/26/20: 22.5% disability (62/80)       Latex Allergy:  [x]NO      []YES  Preferred Language for Healthcare:   [x]English       []other:      Pain level:  2-3/10     SUBJECTIVE:  Patient reports knee is really sore and hurting today. Enjoyed working with [employer] in [employer] yesterday.      OBJECTIVE:  Bruising medial aspect of R knee, pt unsure of when it appeared     Right Knee AROM (pre-tx): -12-98  · Right Knee Passive Flexion (post-tx: 108  · Right Kne Passive Extension (post-tx): -4   No increased warmth, firmness, or redness in Stretch on Wall - 5 reps - 1 sets - 30 sec hold - 8x daily - 7x weekly  Prone Quadriceps Stretch with Strap - 5 reps - 1 sets - 30 sec hold - 8x daily - 7x weekly    Therapeutic Exercise and NMR EXR  [x] (18426) Provided verbal/tactile cueing for activities related to strengthening, flexibility, endurance, ROM for improvements in LE, proximal hip, and core control with self care, mobility, lifting, ambulation.  [] (00522) Provided verbal/tactile cueing for activities related to improving balance, coordination, kinesthetic sense, posture, motor skill, proprioception  to assist with LE, proximal hip, and core control in self care, mobility, lifting, ambulation and eccentric single leg control.      NMR and Therapeutic Activities:    [] (77188 or 50010) Provided verbal/tactile cueing for activities related to improving balance, coordination, kinesthetic sense, posture, motor skill, proprioception and motor activation to allow for proper function of core, proximal hip and LE with self care and ADLs  [] (20480) Gait Re-education- Provided training and instruction to the patient for proper LE, core and proximal hip recruitment and positioning and eccentric body weight control with ambulation re-education including up and down stairs     Home Exercise Program:    [x] (76302) Reviewed/Progressed HEP activities related to strengthening, flexibility, endurance, ROM of core, proximal hip and LE for functional self-care, mobility, lifting and ambulation/stair navigation   [] (76038)Reviewed/Progressed HEP activities related to improving balance, coordination, kinesthetic sense, posture, motor skill, proprioception of core, proximal hip and LE for self care, mobility, lifting, and ambulation/stair navigation      Manual Treatments:  PROM / STM / Oscillations-Mobs:  G-I, II, III, IV (PA's, Inf., Post.)  [x] (80603) Provided manual therapy to mobilize LE, proximal hip and/or LS spine soft tissue/joints for the purpose of modulating pain, promoting relaxation,  increasing ROM, reducing/eliminating soft tissue swelling/inflammation/restriction, improving soft tissue extensibility and allowing for proper ROM for normal function with self care, mobility, lifting and ambulation. Modalities:     [] GAME READY (VASO)- for significant edema, swelling, pain control. - 15 minutes    Charges:  Timed Code Treatment Minutes: 60   Total Treatment Minutes: 75     Time In: 9:10am  Time Out: 10:35am      [] EVAL (LOW) 83261 (typically 20 minutes face-to-face)  [] EVAL (MOD) 41129 (typically 30 minutes face-to-face)  [] EVAL (HIGH) 93475 (typically 45 minutes face-to-face)  [] RE-EVAL     [x] TI(67035) x 1       [] IONTO  [] NMR (29037) x        [] VASO-GameReady for 15 minutes on Low, max cold   [x] Manual (16176) x 3      [] Other:  [] TA x         [] Mech Traction (85792)  [] ES(attended) (81561)        [] ES (un) (36141):       GOALS:  Patient stated goal: Patient will be able to walk 30 minutes without restriction or onset or right knee pain. [x] Progressing: [] Met: [] Not Met: [] Adjusted    Therapist goals for Patient:   Short Term Goals: To be achieved in: 2 weeks  1. Independent in HEP and progression per patient tolerance, in order to prevent re-injury. [] Progressing: [x] Met: [] Not Met: [] Adjusted  2. Patient will have a decrease in pain to facilitate improvement in movement, function, and ADLs as indicated by Functional Deficits. [] Progressing: [x] Met: [] Not Met: [] Adjusted    Long Term Goals: To be achieved in: 8 weeks  1. Disability index score of 30% or less for the LEFS to assist with reaching prior level of function. [] Progressing: [x] Met: [] Not Met: [] Adjusted  2. Patient will demonstrate ROM on affected side equal to the unaffected side to allow for proper joint functioning as indicated by patients Functional Deficits. [x] Progressing: [] Met: [] Not Met: [] Adjusted  3.  Patient will demonstrate an increase in Strength to good proximal hip strength and control, within 5lb HHD in LE to allow for proper functional mobility as indicated by patients Functional Deficits. [x] Progressing: [] Met: [] Not Met: [] Adjusted  4. Patient will return to ascend/descend stairs reciprocallyy without increased symptoms or restriction. [] Progressing: [x] Met: [] Not Met: [] Adjusted  5. Patient will be able to perform a sit-to-stand without UE assist with no onset of right knee pain. [] Progressing: [x] Met: [] Not Met: [] Adjusted   6. Patient will be able to perform kneel bilaterally with UE assist with no onset of right knee pain. [x] Progressing: [] Met: [] Not Met: [] Adjusted   7. Patient will be able to get in and out of a car without UE assist with no onset of right knee pain. [x] Progressing: [] Met: [] Not Met: [] Adjusted  8. Patient will be able to drive for 30 minutes without UE assist with no onset of right knee pain. [x] Progressing: [] Met: [] Not Met: [] Adjusted    Progression Towards Functional goals:  [x] Patient is progressing as expected towards functional goals listed. [] Progression is slowed due to complexities listed. [] Progression has been slowed due to co-morbidities. [] Plan just implemented, too soon to assess goals progression  [] Other:         Overall Progression Towards Functional goals/ Treatment Progress Update:  [x] Patient is progressing as expected towards functional goals listed. [] Progression is slowed due to complexities/Impairments listed. [] Progression has been slowed due to co-morbidities.   [] Plan just implemented, too soon to assess goals progression <30days   [] Goals require adjustment due to lack of progress  [] Patient is not progressing as expected and requires additional follow up with physician  [] Other    Prognosis for POC: [x] Good [] Fair  [] Poor      Patient requires continued skilled intervention: [x] Yes  [] No    Treatment/Activity Tolerance:  [x] Patient able to complete treatment  [] Patient limited by fatigue  [] Patient limited by pain    [] Patient limited by other medical complications  [] Other:     ASSESSMENT: Patient tolerated treatment well. Her ROM gains continue to be hard to attain, but still remain at pre-surgical levels. She has accomplished both of her two short-term goals and 3/8 long-term goals in just under a week of treatment. She needs to continue PT without interruption if she is to maximize her outcome following surgical manipulation. She requires further skilled PT intervention in order to improve right knee ROM, decrease right knee swelling, increase right LE muscular endurance, and increase right LE strength in order to return to her prior level of function and activity without the onset of right knee pain. PLAN: 1-2x per week for 8 weeks (beginning 10/21/20, ending 12/16/20)  [x] Continue per plan of care [] Alter current plan (see comments above)  [] Plan of care initiated [] Hold pending MD visit [] Discharge      Electronically signed by: Tae Julio PT, DPT [id number]      Note: If patient does not return for scheduled/ recommended follow up visits, this note will serve as a discharge from care along with most recent update on progress.

## 2020-10-27 NOTE — TELEPHONE ENCOUNTER
PT called [facility] to request that most recent authorization of two visits be extended to 5 visits as the patient will requires skilled intervention through the end of her allowed number of visits due to the nature of her condition s/p manipulation on 10/20/20 following right TKA on 7/14/20. PT has seen her for 15 of her annual allowed 20 PT visits. Two further visits were approved for tomorrow and 11/29/20. PT was requesting the final three be granted at this time. Hilda Ching from [address] [place] [address] noted that only two visits were approved because that is what [facility] was communicating to [facility] was all that remained. He recommend our clinic and/or the patient call [facility] to confirm benefit availability.     Alicia Espinoza, PT, DPT ([place] PT License #: [id number])

## 2020-10-28 ENCOUNTER — HOSPITAL ENCOUNTER (OUTPATIENT)
Dept: PHYSICAL THERAPY | Age: 60
Setting detail: THERAPIES SERIES
Discharge: HOME | End: 2020-10-28
Payer: COMMERCIAL

## 2020-10-28 PROCEDURE — 97110 THERAPEUTIC EXERCISES: CPT

## 2020-10-28 PROCEDURE — 97140 MANUAL THERAPY 1/> REGIONS: CPT

## 2020-10-28 NOTE — FLOWSHEET NOTE
[facility], [address] [place] [address]  Phone: [phone]  Fax [phone]    Physical Therapy Treatment Note/ Progress Report:         Date:  10/28/2020    Patient Name:  Alondra Zheng    :  1960  MRN: [id number]  Restrictions/Precautions:    Medical/Treatment Diagnosis Information:  · Diagnosis: P38.360 (ICD-10-CM) - Status post total knee replacement, right  · Treatment Diagnosis: Right Knee Pain (M25.561); Right Knee Effusion (M25.461); Right Kneee Stiffness (M25.661); Right Lower Extremity Weakness (B26.21)  Insurance/Certification information:  PT Insurance Information: [facility]  Physician Information:  Referring Practitioner: MARIO Calixto  Has the plan of care been signed (Y/N):        [x]  Yes  []  No     Date of Patient follow up with Physician: 10/23/20      Is this a Progress Report:     [x]  Yes  []  No        If Yes:  Date Range for reporting period:  Beginning: 10/21/20  Ending: 10/26/20    Progress report will be due (10 Rx or 30 days whichever is less):       Recertification will be due (POC Duration  / 90 days whichever is less):  20        Visit # Insurance Allowable Requires auth   16 $[id number]    []no        [x]yes:     Functional Scale (LEFS):  · 8/3/20: 63.75% Disability (29/80)   · 9/3/20: 16.25% disability (67/80)  · 10/21/20: 32.5% disability (54/80)  · 10/26/20: 22.5% disability (62/80)       Latex Allergy:  [x]NO      []YES  Preferred Language for Healthcare:   [x]English       []other:      Pain level: Moderate     SUBJECTIVE:  Patient reports knee feels more stiff and sore today. She asks if it is always going to be like this because she does not think she can manage with her right knee as still and sore as it is. She performed her HEP 6x yesterday. OBJECTIVE:  Bruising medial aspect of R knee Noted at yesterdays visit is still present.  It lies between the superior and inferior medial surgical ports and measures [id number].  Right Knee AROM (pre-tx): -15-94  · Right Knee Passive Flexion (post-tx: 97  · Right Kne Passive Extension (post-tx): -6   No increased warmth, firmness, or redness in right calf.  Gait: Decreased weightbearing on right; inadequate hip and knee flexion in loading response phase of gait; no terminal stance on right.  Mild right knee swelling. RESTRICTIONS/PRECAUTIONS: WBAT    Exercises/Interventions:     Therapeutic Ex (79868) Volume Notes/CUES   Circuit (5x)  -Seated Hamstring Stretch  -Standing Gastroc Stretch   30\"  30\" Performed on treatment table with PT available as need at neighboring table 6ft away. Prone Quad Stretch with Strap 5x30\" Performed on treatment table with PT available as need at neighboring table 6ft away. Manual Intervention (26713) 38 min    STM to right quadriceps and hamstring. Grade III-IV anterior and posterior glides of the right tibia. Grade III-IV inferior glides of the right patella. Grade III-IV extension mobilizations of the right knee     Manual stretching of the right hamstrings. Manual stretching of the right quadriceps     PROM: Right Knee Flexion and Extension               NMR re-education (43162)  CUES NEEDED                                           Therapeutic Activity (51343)                                   Additional time was spent explaining exercise instruction, exercise set up, and rest breaks. Patient Education:   · Continue current HEP. · Do not leave right knee resting in mid-range ROM. Rest it at either end-range flexion or propped up in end range extension. Access Code: [email or web address]   URL: [email or web address]   Date: 10/21/2020   Prepared by: Tate Sal     Exercises  Long Sitting Quad Set - 1 sets - 3 hold - 6 Time - 8x daily - 7x weekly  Sitting Heel Slide with Towel - 1 sets - 1 sec hold - 6 minutes Time - 8x daily - 7x weekly  Seated Table Hamstring Stretch - 5 reps - 1 sets - 30 sec Hold - 8x daily - 7x weekly  Gastroc Stretch on Wall - 5 reps - 1 sets - 30 sec hold - 8x daily - 7x weekly  Prone Quadriceps Stretch with Strap - 5 reps - 1 sets - 30 sec hold - 8x daily - 7x weekly    Therapeutic Exercise and NMR EXR  [x] (43825) Provided verbal/tactile cueing for activities related to strengthening, flexibility, endurance, ROM for improvements in LE, proximal hip, and core control with self care, mobility, lifting, ambulation.  [] (98612) Provided verbal/tactile cueing for activities related to improving balance, coordination, kinesthetic sense, posture, motor skill, proprioception  to assist with LE, proximal hip, and core control in self care, mobility, lifting, ambulation and eccentric single leg control.      NMR and Therapeutic Activities:    [] (88932 or 95535) Provided verbal/tactile cueing for activities related to improving balance, coordination, kinesthetic sense, posture, motor skill, proprioception and motor activation to allow for proper function of core, proximal hip and LE with self care and ADLs  [] (08791) Gait Re-education- Provided training and instruction to the patient for proper LE, core and proximal hip recruitment and positioning and eccentric body weight control with ambulation re-education including up and down stairs     Home Exercise Program:    [x] (44005) Reviewed/Progressed HEP activities related to strengthening, flexibility, endurance, ROM of core, proximal hip and LE for functional self-care, mobility, lifting and ambulation/stair navigation   [] (27689)Reviewed/Progressed HEP activities related to improving balance, coordination, kinesthetic sense, posture, motor skill, proprioception of core, proximal hip and LE for self care, mobility, lifting, and ambulation/stair navigation      Manual Treatments:  PROM / STM / Oscillations-Mobs:  G-I, II, III, IV (PA's, Inf., Post.)  [x] (46708) Provided Deficits. [x] Progressing: [] Met: [] Not Met: [] Adjusted  3. Patient will demonstrate an increase in Strength to good proximal hip strength and control, within 5lb HHD in LE to allow for proper functional mobility as indicated by patients Functional Deficits. [x] Progressing: [] Met: [] Not Met: [] Adjusted  4. Patient will return to ascend/descend stairs reciprocallyy without increased symptoms or restriction. [] Progressing: [x] Met: [] Not Met: [] Adjusted  5. Patient will be able to perform a sit-to-stand without UE assist with no onset of right knee pain. [] Progressing: [x] Met: [] Not Met: [] Adjusted   6. Patient will be able to perform kneel bilaterally with UE assist with no onset of right knee pain. [x] Progressing: [] Met: [] Not Met: [] Adjusted   7. Patient will be able to get in and out of a car without UE assist with no onset of right knee pain. [x] Progressing: [] Met: [] Not Met: [] Adjusted  8. Patient will be able to drive for 30 minutes without UE assist with no onset of right knee pain. [x] Progressing: [] Met: [] Not Met: [] Adjusted    Progression Towards Functional goals:  [x] Patient is progressing as expected towards functional goals listed. [] Progression is slowed due to complexities listed. [] Progression has been slowed due to co-morbidities. [] Plan just implemented, too soon to assess goals progression  [] Other:         Overall Progression Towards Functional goals/ Treatment Progress Update:  [x] Patient is progressing as expected towards functional goals listed. [] Progression is slowed due to complexities/Impairments listed. [] Progression has been slowed due to co-morbidities.   [] Plan just implemented, too soon to assess goals progression <30days   [] Goals require adjustment due to lack of progress  [] Patient is not progressing as expected and requires additional follow up with physician  [] Other    Prognosis for POC: [x] Good [] Fair  [] Poor      Patient requires continued skilled intervention: [x] Yes  [] No    Treatment/Activity Tolerance:  [x] Patient able to complete treatment  [] Patient limited by fatigue  [] Patient limited by pain    [] Patient limited by other medical complications  [] Other:     ASSESSMENT: Patient tolerated treatment well. Her ROM gains continue to be hard to attain and are beginning to regress to pre-manipulation levels. She has one more approved PT visit. PT had a [id number] with the company that authorizes her visits for Jc Mckeon. There is a discrepancy in the number of visits she has been seen in PT and the number of visits her insurance thinks she has been seen. She has been provided the number to call to address this discrepancy. If necessary, utilization of the [facility] program can continue to be used to attempt to attain further progress. She requires further skilled PT intervention in order to improve right knee ROM, decrease right knee swelling, increase right LE muscular endurance, and increase right LE strength in order to return to her prior level of function and activity without the onset of right knee pain. PLAN: 1-2x per week for 8 weeks (beginning 10/21/20, ending 12/16/20)  [x] Continue per plan of care [] Alter current plan (see comments above)  [] Plan of care initiated [] Hold pending MD visit [] Discharge      Electronically signed by: Adán Perez PT, DPT ([place] PT License #: [id number])        Note: If patient does not return for scheduled/ recommended follow up visits, this note will serve as a discharge from care along with most recent update on progress.

## 2020-10-29 ENCOUNTER — APPOINTMENT (OUTPATIENT)
Dept: PHYSICAL THERAPY | Age: 60
End: 2020-10-29
Payer: COMMERCIAL

## 2020-10-29 ENCOUNTER — HOSPITAL ENCOUNTER (OUTPATIENT)
Dept: PHYSICAL THERAPY | Age: 60
Discharge: HOME | End: 2020-10-29

## 2020-10-30 ENCOUNTER — HOSPITAL ENCOUNTER (OUTPATIENT)
Dept: PHYSICAL THERAPY | Age: 60
Setting detail: THERAPIES SERIES
Discharge: HOME | End: 2020-10-30
Payer: COMMERCIAL

## 2020-10-30 ENCOUNTER — OFFICE VISIT (OUTPATIENT)
Dept: ORTHOPEDIC SURGERY | Age: 60
End: 2020-10-30

## 2020-10-30 VITALS — HEIGHT: 67 IN | BODY MASS INDEX: 35.63 KG/M2 | WEIGHT: 227 LBS

## 2020-10-30 PROCEDURE — 99024 POSTOP FOLLOW-UP VISIT: CPT | Performed by: PHYSICIAN ASSISTANT

## 2020-10-30 PROCEDURE — 97110 THERAPEUTIC EXERCISES: CPT

## 2020-10-30 PROCEDURE — 97140 MANUAL THERAPY 1/> REGIONS: CPT

## 2020-10-30 NOTE — FLOWSHEET NOTE
[facility], [address]  Phone: [phone]  Fax [phone]    Physical Therapy Treatment Note/ Progress Report:         Date:  10/30/2020    Patient Name:  Radha Young    :  1960  MRN: [id number]  Restrictions/Precautions:    Medical/Treatment Diagnosis Information:  · Diagnosis: F26.577 (ICD-10-CM) - Status post total knee replacement, right  · Treatment Diagnosis: Right Knee Pain (M25.561); Right Knee Effusion (M25.461); Right Kneee Stiffness (M25.661); Right Lower Extremity Weakness (Y29.33)  Insurance/Certification information:  PT Insurance Information: [facility]  Physician Information:  Referring Practitioner: MARIO Schumacher  Has the plan of care been signed (Y/N):        [x]  Yes  []  No     Date of Patient follow up with Physician: 20      Is this a Progress Report:     [x]  Yes  []  No        If Yes:  Date Range for reporting period:  Beginning: 10/21/20  Ending: 10/26/20    Progress report will be due (10 Rx or 30 days whichever is less):       Recertification will be due (POC Duration  / 90 days whichever is less):  20        Visit # Insurance Allowable Requires auth   17 $0CP-20PT    []no        [x]yes:     Functional Scale (LEFS):  · 8/3/20: 63.75% Disability (29/80)   · 9/3/20: 16.25% disability (67/80)  · 10/21/20: 32.5% disability (54/80)  · 10/26/20: 22.5% disability (62/80)       Latex Allergy:  [x]NO      []YES  Preferred Language for Healthcare:   [x]English       []other:      Pain level:  Low to Moderate     SUBJECTIVE:  Patient reports she called her insurance an they informed her that she has exhausted her PT benefits for the year. She thinks this is because they are counting the 2-3 visits of PT she received at her house after her initial TKA. She saw her surgical PA-C who has encouraged her to keep coming to therapy.     OBJECTIVE:     Right Knee AROM (pre-tx): -  · Right Knee Passive Flexion (post-tx: 107  · Right Kne Passive Extension (post-tx): -6   No increased warmth, firmness, or redness in right calf.  Gait: Decreased weightbearing on right; inadequate hip and knee flexion in loading response phase of gait; no terminal stance on right.  Mild right knee swelling. RESTRICTIONS/PRECAUTIONS: WBAT    Exercises/Interventions:     Therapeutic Ex (29348) Volume Notes/CUES   Circuit (5x)  -Seated Hamstring Stretch  -Standing Gastroc Stretch   30\"  30\" Performed on treatment table with PT available as need at neighboring table 6ft away. Prone Quad Stretch with Strap 5x30\" Performed on treatment table with PT available as need at neighboring table 6ft away. Manual Intervention (73244) 39 min    STM to right quadriceps and hamstring. Grade III-IV anterior and posterior glides of the right tibia. Grade III-IV inferior glides of the right patella. Grade III-IV extension mobilizations of the right knee     Manual stretching of the right hamstrings. Manual stretching of the right quadriceps     PROM: Right Knee Flexion and Extension               NMR re-education (04632)  CUES NEEDED                                           Therapeutic Activity (35038)                                   Additional time was spent explaining exercise instruction, exercise set up, and rest breaks. Patient Education:   · Continue current HEP. · Do not leave right knee resting in mid-range ROM. Rest it at either end-range flexion or propped up in end range extension. ·  informed therapist there is a means by which she can appeal for more visits from her insurance. She was instructed to call her insurance and ask about this. In the meantime she has been instructed to schedule two appointments per week with [facility]. Access Code: [email or web address]   URL: [email or web address]   Date: 10/21/2020   Prepared by: Quin Kawasaki Exercises  Long Sitting Quad Set - 1 sets - 3 hold - 6 Time - 8x daily - 7x weekly  Sitting Heel Slide with Towel - 1 sets - 1 sec hold - 6 minutes Time - 8x daily - 7x weekly  Seated Table Hamstring Stretch - 5 reps - 1 sets - 30 sec Hold - 8x daily - 7x weekly  Gastroc Stretch on Wall - 5 reps - 1 sets - 30 sec hold - 8x daily - 7x weekly  Prone Quadriceps Stretch with Strap - 5 reps - 1 sets - 30 sec hold - 8x daily - 7x weekly    Therapeutic Exercise and NMR EXR  [x] (38002) Provided verbal/tactile cueing for activities related to strengthening, flexibility, endurance, ROM for improvements in LE, proximal hip, and core control with self care, mobility, lifting, ambulation.  [] (10975) Provided verbal/tactile cueing for activities related to improving balance, coordination, kinesthetic sense, posture, motor skill, proprioception  to assist with LE, proximal hip, and core control in self care, mobility, lifting, ambulation and eccentric single leg control.      NMR and Therapeutic Activities:    [] (74182 or 28944) Provided verbal/tactile cueing for activities related to improving balance, coordination, kinesthetic sense, posture, motor skill, proprioception and motor activation to allow for proper function of core, proximal hip and LE with self care and ADLs  [] (06327) Gait Re-education- Provided training and instruction to the patient for proper LE, core and proximal hip recruitment and positioning and eccentric body weight control with ambulation re-education including up and down stairs     Home Exercise Program:    [x] (13478) Reviewed/Progressed HEP activities related to strengthening, flexibility, endurance, ROM of core, proximal hip and LE for functional self-care, mobility, lifting and ambulation/stair navigation   [] (72549)Reviewed/Progressed HEP activities related to improving balance, coordination, kinesthetic sense, posture, motor skill, proprioception of core, proximal hip and LE for self care, mobility, lifting, and ambulation/stair navigation      Manual Treatments:  PROM / STM / Oscillations-Mobs:  G-I, II, III, IV (PA's, Inf., Post.)  [x] (27759) Provided manual therapy to mobilize LE, proximal hip and/or LS spine soft tissue/joints for the purpose of modulating pain, promoting relaxation,  increasing ROM, reducing/eliminating soft tissue swelling/inflammation/restriction, improving soft tissue extensibility and allowing for proper ROM for normal function with self care, mobility, lifting and ambulation. Modalities:     [] GAME READY (VASO)- for significant edema, swelling, pain control. - 15 minutes    Charges:  Timed Code Treatment Minutes: 57   Total Treatment Minutes: 57     Time In: 9:45am  Time Out: 10:42am      [] EVAL (LOW) 21697 (typically 20 minutes face-to-face)  [] EVAL (MOD) 65958 (typically 30 minutes face-to-face)  [] EVAL (HIGH) 68498 (typically 45 minutes face-to-face)  [] RE-EVAL     [x] UH(42109) x 1       [] IONTO  [] NMR (13974) x        [] VASO-GameReady for 15 minutes on Low, max cold   [x] Manual (06098) x 3      [] Other:  [] TA x         [] Mech Traction (90960)  [] ES(attended) (96265)        [] ES (un) (01535):       GOALS:  Patient stated goal: Patient will be able to walk 30 minutes without restriction or onset or right knee pain. [x] Progressing: [] Met: [] Not Met: [] Adjusted    Therapist goals for Patient:   Short Term Goals: To be achieved in: 2 weeks  1. Independent in HEP and progression per patient tolerance, in order to prevent re-injury. [] Progressing: [x] Met: [] Not Met: [] Adjusted  2. Patient will have a decrease in pain to facilitate improvement in movement, function, and ADLs as indicated by Functional Deficits. [] Progressing: [x] Met: [] Not Met: [] Adjusted    Long Term Goals: To be achieved in: 8 weeks  1. Disability index score of 30% or less for the LEFS to assist with reaching prior level of function.    [] Progressing: [x] Met: [] Not Met: [] Adjusted  2. Patient will demonstrate ROM on affected side equal to the unaffected side to allow for proper joint functioning as indicated by patients Functional Deficits. [x] Progressing: [] Met: [] Not Met: [] Adjusted  3. Patient will demonstrate an increase in Strength to good proximal hip strength and control, within 5lb HHD in LE to allow for proper functional mobility as indicated by patients Functional Deficits. [x] Progressing: [] Met: [] Not Met: [] Adjusted  4. Patient will return to ascend/descend stairs reciprocallyy without increased symptoms or restriction. [] Progressing: [x] Met: [] Not Met: [] Adjusted  5. Patient will be able to perform a sit-to-stand without UE assist with no onset of right knee pain. [] Progressing: [x] Met: [] Not Met: [] Adjusted   6. Patient will be able to perform kneel bilaterally with UE assist with no onset of right knee pain. [x] Progressing: [] Met: [] Not Met: [] Adjusted   7. Patient will be able to get in and out of a car without UE assist with no onset of right knee pain. [x] Progressing: [] Met: [] Not Met: [] Adjusted  8. Patient will be able to drive for 30 minutes without UE assist with no onset of right knee pain. [x] Progressing: [] Met: [] Not Met: [] Adjusted    Progression Towards Functional goals:  [x] Patient is progressing as expected towards functional goals listed. [] Progression is slowed due to complexities listed. [] Progression has been slowed due to co-morbidities. [] Plan just implemented, too soon to assess goals progression  [] Other:         Overall Progression Towards Functional goals/ Treatment Progress Update:  [x] Patient is progressing as expected towards functional goals listed. [] Progression is slowed due to complexities/Impairments listed. [] Progression has been slowed due to co-morbidities.   [] Plan just implemented, too soon to assess goals progression <30days   [] Goals require adjustment due to lack of

## 2020-11-03 ENCOUNTER — HOSPITAL ENCOUNTER (OUTPATIENT)
Dept: PHYSICAL THERAPY | Age: 60
Discharge: HOME | End: 2020-11-03
Payer: COMMERCIAL

## 2020-11-05 ENCOUNTER — HOSPITAL ENCOUNTER (OUTPATIENT)
Dept: PHYSICAL THERAPY | Age: 60
Discharge: HOME | End: 2020-11-05

## 2020-11-05 NOTE — FLOWSHEET NOTE
[facility], [address]  Phone: [phone]  Fax [phone]                                            Lower Extremity Daily Performance Training Note  Date:  2020    Patient Name:  Dejan Cary  [facility]"   :  1960  MRN: [id number]  Restrictions/Precautions: DEnies any other ortho issues  Medical/Treatment Diagnosis Information:   ·  R TKR 2020 - R Manip -10-  ·  ADLs - Walk without cane    Physician Information:   Krzysztof Regalado     Visit Number:  paid 210 on 10-    Subjective: Pt states that she did well after the initial session. Knee feels pretty good today. Objective:  Observation:   ROM -10      Exercises:  Exercise/Equipment 2020   Recumbent (South seat 6) x5'   EZ Stretch 7' Flexion  (96)   SLB at Cage 15 x5\"   Mini Squats at Cage x15   Gait over Cones x5' VC   Leg Press Bilat (4) 60# 3x12   Leg Press Ecc R 40# 3x12   Leg Press R 30# 3x10   Leg Curl Bilat   (0-4) 20# 3x12   Leg Ext MA Isos  75-45-30-10 (E-F-0-1) 10 x10\" each       NIGEL (5) TKE  R  (3) 45# 25x5\"   NIGEL (5) ABD R   (0) 45# 3x10                                       CP 10'     Other Therapeutic Activities:     Home Exercise Program: Continue with pt exercises    Patient Education:    Reviewed the need for her to do LLLD stretch in both flexion and extension. Discussed good vs bad sore.     Assessment:  [x] Patient tolerated treatment well [] Patient limited by fatigue  [] Patient limited by pain  [] Patient limited by other medical complications  [] Other:     Prognosis: [x] Good [] Fair  [] Poor    Patient Requires Follow-up: [x] Yes  [] No    Plan:   [] Continue per plan of care [] Alter current plan (see comments)  [x] Plan of care initiated [] Hold pending MD visit [] Discharge    Electronically signed by:  Radha Tinajero, RICKY, MIKIE     Tx was performed by MIKIE as a part of the [facility] program following PT's recommendations/guidance.        Cosigned by:

## 2020-11-09 ENCOUNTER — HOSPITAL ENCOUNTER (OUTPATIENT)
Dept: PHYSICAL THERAPY | Age: 60
Discharge: HOME | End: 2020-11-09
Payer: COMMERCIAL

## 2020-11-09 NOTE — FLOWSHEET NOTE
[facility], [address]  Phone: [phone]  Fax [phone]                                            Lower Extremity Daily Performance Training Note  Date:  2020    Patient Name:  Diego Remy  [facility]"   :  1960  MRN: [id number]  Restrictions/Precautions: Denies any other ortho issues  Medical/Treatment Diagnosis Information:   ·  R TKR 2020 - R Manip -10-  ·  ADLs - Walk without cane    Physician Information:   Michel Cheng     Visit Number:  paid 210 on 10-    Subjective: Pt states that she did well after the initial session. Knee feels pretty good today. Objective:  Observation:   ROM -10      Exercises:  Exercise/Equipment 2020   Recumbent (South seat 6) x5'   EZ Stretch 7' Flexion  (96)   SLB at Cage 15 x5\"   Mini Squats at Cage x20   Gait Training x5' VC   Leg Press Bilat (4) 60# 3x15   Leg Press Ecc R 40# 3x15   Leg Press R 30# 3x12   Leg Curl Bilat   (0-4) 20# 3x15   Leg Ext MA Isos  75-45-30-10 (E-F-0-1) 10 x10\" each       NIGEL (5) TKE  R  (3) 45# 25x5\"   NIGEL (5) ABD R   (0) 45# 3x10                                       CP 10'     Other Therapeutic Activities:     Home Exercise Program: Continue with pt exercises    Patient Education:    Reviewed the need for her to do LLLD stretch in both flexion and extension. Discussed good vs bad sore.     Assessment:  [x] Patient tolerated treatment well [] Patient limited by fatigue  [] Patient limited by pain  [] Patient limited by other medical complications  [] Other:     Prognosis: [x] Good [] Fair  [] Poor    Patient Requires Follow-up: [x] Yes  [] No    Plan:   [] Continue per plan of care [] Alter current plan (see comments)  [x] Plan of care initiated [] Hold pending MD visit [] Discharge    Electronically signed by:  RICKY Bauer ATC     Tx was performed by MIKIE as a part of the [facility] program following PT's recommendations/guidance.        Cosigned by:

## 2020-11-11 ENCOUNTER — HOSPITAL ENCOUNTER (OUTPATIENT)
Dept: PHYSICAL THERAPY | Age: 60
Discharge: HOME | End: 2020-11-11
Payer: COMMERCIAL

## 2020-11-11 NOTE — FLOWSHEET NOTE
[facility],  [address] [place]  Phone: [phone]  Fax [phone]                                            Lower Extremity Daily Performance Training Note  Date:  2020    Patient Name:  Brandon Cardoza  [facility]"   :  1960  MRN: [id number]  Restrictions/Precautions: Denies any other ortho issues  Medical/Treatment Diagnosis Information:   ·  R TKR 2020 - R Manip -10-  ·  ADLs - Walk without cane    Physician Information:   Angelito Addiecesar     Visit Number:  paid 210 on 10-    Subjective: Pt states that the knee feels pretty good today. Objective:  Observation:   ROM -10      Exercises:  Exercise/Equipment 2020   Recumbent (South seat 6) x5'   EZ Stretch 7' Flexion  (96)   SLB at Cage 15 x5\"   Mini Squats at Cage x20   Gait Training x5' VC   Leg Press Bilat (4) 60# 3x15   Leg Press Ecc R 40# 3x15   Leg Press R 30# 3x15   Leg Curl Bilat   (0-4) 20# 3x15   Leg Ext MA Isos  75-45-30-10 (E-F-0-1) 10 x10\" each       NIGEL (5) TKE  R  (3) 45# 25x5\"   NIGEL (5) ABD R   (0) 45# 3x12                                       CP 10'     Other Therapeutic Activities:     Home Exercise Program: Continue with pt exercises    Patient Education:    Reviewed the need for her to do LLLD stretch in both flexion and extension. Discussed good vs bad sore.     Assessment:  [x] Patient tolerated treatment well [] Patient limited by fatigue  [] Patient limited by pain  [] Patient limited by other medical complications  [] Other: Improved gait mechanics with less loss of balance    Prognosis: [x] Good [] Fair  [] Poor    Patient Requires Follow-up: [x] Yes  [] No    Plan:   [x] Continue per plan of care [] Alter current plan (see comments)  [] Plan of care initiated [] Hold pending MD visit [] Discharge    Electronically signed by:  RICKY Kyle, MIKIE     Tx was performed by MIKIE as a part of the [facility] program following PT's recommendations/guidance.        Cosigned by:

## 2020-11-16 ENCOUNTER — HOSPITAL ENCOUNTER (OUTPATIENT)
Dept: PHYSICAL THERAPY | Age: 60
Discharge: HOME | End: 2020-11-16
Payer: COMMERCIAL

## 2020-11-16 ENCOUNTER — OFFICE VISIT (OUTPATIENT)
Dept: ORTHOPEDIC SURGERY | Age: 60
End: 2020-11-16

## 2020-11-16 VITALS — WEIGHT: 227 LBS | BODY MASS INDEX: 35.63 KG/M2 | HEIGHT: 67 IN

## 2020-11-16 PROCEDURE — 99024 POSTOP FOLLOW-UP VISIT: CPT | Performed by: ORTHOPAEDIC SURGERY

## 2020-11-16 NOTE — PROGRESS NOTES
OPERATION PERFORMED:10/20/2020  1.  Examination under anesthesia and video arthroscopy, right knee. 2.  Arthroscopic surgery involving the right knee with medial and  lateral releases utilizing a combination of 5.0 full-radius resector and  the Mickiel [employer] and [employer]. 3.  Synovectomy medially, laterally, and resection of adhesions in the  suprapatellar pouch as well - tricompartmental.  4.  Manipulation under anesthesia    Returns after the aforementioned procedure. She has been going to gap. She is got 1 or 2 Sessions less. She reports that she is functioning okay but she really has not made significant progress after her manipulation. The incision looks great. She got minimal swelling and no pretibial edema. Range of motion today is 4-90    . I talked about dietary other treatment options with her consent including a very a revision to arthroscopy manipulation and scar removal.  She is not really interested in this option. She promised that she is going to work diligently on working on her range of motion we will check her 1 more time about 3 weeks. Despite her range of motion rotation she seems to be functioning reasonably well. I have personally performed and/or participated in the history, exam and medical decision making and agree with all pertinent clinical information. I have also reviewed and agree with the past medical, family and social history unless otherwise noted. This dictation was performed with a verbal recognition program (DRAGON) and it was checked for errors. It is possible that there are still dictated errors within this office note. If so, please bring any errors to my attention for an addendum. All efforts were made to ensure that this office note is accurate.           Torin Cantu MD

## 2020-11-16 NOTE — FLOWSHEET NOTE
[facility], [address]  Phone: [phone]  Fax [phone]                                            Lower Extremity Daily Performance Training Note  Date:  2020    Patient Name:  Ashley Elizabeth  [facility]"   :  1960  MRN: [id number]  Restrictions/Precautions: Denies any other ortho issues  Medical/Treatment Diagnosis Information:   ·  R TKR 2020 - R Manip -10-  ·  ADLs - Walk without cane    Physician Information:   Jenny Perry     Visit Number:  paid 210 on 10-    Subjective: Pt states that she saw Dr Jenny Perry today and that he wants to see her again in 3 weeks. Objective:  Observation:   ROM -10      Exercises:  Exercise/Equipment 2020   Recumbent (South seat 6) x5'   EZ Stretch 7' Flexion  (96)   SLB at Cage 15 x5\"   Mini Squats at Cage x20   Gait Training x5' VC   Leg Press Bilat (4) 60# 3x15   Leg Press Ecc R 40# 3x15   Leg Press R 30# 3x15   Leg Curl Bilat   (0-4) 20# 3x15   Leg Ext MA Isos  75-45-30-10 (E-F-0-1) 10 x10\" each       NIGEL (5) TKE  R  (3) 45# 30x5\"   NIGEL (5) ABD R   (0) 45# 3x15                                       CP 10'     Other Therapeutic Activities:     Home Exercise Program: Continue with pt exercises    Patient Education:    Reviewed the need for her to do LLLD stretch in both flexion and extension. Discussed good vs bad sore.     Assessment:  [x] Patient tolerated treatment well [] Patient limited by fatigue  [] Patient limited by pain  [] Patient limited by other medical complications  [] Other: Improved gait mechanics with less loss of balance    Prognosis: [x] Good [] Fair  [] Poor    Patient Requires Follow-up: [x] Yes  [] No    Plan: Reviewed HEP to continue with and explained to pt that she can return to see me if she has any issues/questions  [x] Continue per plan of care [] Alter current plan (see comments)  [] Plan of care initiated [] Hold pending MD visit [] Discharge    Electronically signed by:  RICKY Pulido ATC     Tx was performed by ATC as a part of the [facility] program following PT's recommendations/guidance.        Cosigned by:

## 2020-12-10 ENCOUNTER — OFFICE VISIT (OUTPATIENT)
Dept: ORTHOPEDIC SURGERY | Age: 60
End: 2020-12-10

## 2020-12-10 VITALS — BODY MASS INDEX: 35.63 KG/M2 | WEIGHT: 227 LBS | HEIGHT: 67 IN

## 2020-12-10 PROCEDURE — 99024 POSTOP FOLLOW-UP VISIT: CPT | Performed by: PHYSICIAN ASSISTANT

## 2020-12-10 NOTE — PROGRESS NOTES
Chief Complaint   Patient presents with    Post-Op Check     RIGHT KNEE SCOPE 10/20/20     PREOPERATIVE DIAGNOSIS:  Arthrofibrosis, right knee, status post  previous total knee replacement.     POSTOPERATIVE DIAGNOSIS:  Arthrofibrosis, right knee, status post  previous total knee replacement.     OPERATION PERFORMED:  1. Examination under anesthesia and video arthroscopy, right knee. 2.  Arthroscopic surgery involving the right knee with medial and  lateral releases utilizing a combination of 5.0 full-radius resector and  the [employer].  3.  Synovectomy medially, laterally, and resection of adhesions in the  suprapatellar pouch as well - tricompartmental.  4.  Manipulation under anesthesia. History of present illness: The patient returns today for a followup after right knee manipulation with arthroscopy performed on 10/20/2020. She states that over the past couple weeks she is seeing good progress with her home physical therapy. She has completed formal physical therapy as well as the gap program with therapy. Overall, she is pleased with the functional use of this knee and happy with her results. Physical examination: Incisions are well healed with no signs of infection. The patient demonstrates range of motion approximately 0 to 115 degrees. Quad tone is adequate. Normal gait without the use of ambulatory devices. Assessment/plan: The patient is doing well after knee manipulation with arthroscopy. They have virtually no complaints. I recommend continued home therapy exercises and a return to normal activity. Follow up next summer for her 1 year visit      Sona Tapia, [facility]    This dictation was performed with a verbal recognition program (DRAGON) and it was checked for errors. It is possible that there are still dictated errors within this office note. If so, please bring any errors to my attention for an addendum.  All efforts were made to ensure that this office note is accurate.

## 2021-05-11 ENCOUNTER — HOSPITAL ENCOUNTER (OUTPATIENT)
Dept: MAMMOGRAPHY | Age: 61
Discharge: HOME | End: 2021-05-16
Payer: COMMERCIAL

## 2021-05-11 VITALS — BODY MASS INDEX: 35.79 KG/M2 | HEIGHT: 67 IN | WEIGHT: 228 LBS

## 2021-05-11 DIAGNOSIS — Z12.31 VISIT FOR SCREENING MAMMOGRAM: ICD-10-CM

## 2021-05-11 PROCEDURE — 77063 BREAST TOMOSYNTHESIS BI: CPT

## 2022-06-09 ENCOUNTER — HOSPITAL ENCOUNTER (OUTPATIENT)
Dept: ULTRASOUND IMAGING | Age: 62
Discharge: HOME | End: 2022-06-09
Payer: COMMERCIAL

## 2022-06-09 DIAGNOSIS — R22.1 NECK MASS: ICD-10-CM

## 2022-06-09 PROCEDURE — 76999 ECHO EXAMINATION PROCEDURE: CPT

## 2022-06-16 ENCOUNTER — HOSPITAL ENCOUNTER (OUTPATIENT)
Dept: CT IMAGING | Age: 62
Discharge: HOME | End: 2022-06-16
Payer: COMMERCIAL

## 2022-06-16 DIAGNOSIS — R22.1 NECK MASS: ICD-10-CM

## 2022-06-16 PROCEDURE — 6360000004 HC RX CONTRAST MEDICATION

## 2022-06-16 PROCEDURE — 70491 CT SOFT TISSUE NECK W/DYE: CPT

## 2022-06-16 RX ADMIN — IOPAMIDOL 75 ML: 755 INJECTION, SOLUTION INTRAVENOUS at 08:33

## 2022-08-24 RX ORDER — LEVOTHYROXINE SODIUM 0.07 MG/1
75 TABLET ORAL DAILY
COMMUNITY

## 2022-08-24 RX ORDER — MECLIZINE HCL 12.5 MG/1
12.5 TABLET ORAL 3 TIMES DAILY PRN
COMMUNITY

## 2022-08-24 NOTE — PROGRESS NOTES
..1.  Do not eat or drink anything after 12 midnight prior to surgery. This includes no water, chewing gum or mints, except for bowel prep complete per MD.  2.  Take the following pills with a small sip of water on the morning of surgery 08/26/2022.  3.  Aspirin, Ibuprofen, Advil, Naproxen, Vitamin E and other Anti-inflammatory products should be stopped for 5 days before surgery or as directed by your physician. 4.  Check with your doctor regarding stopping Plavix, Coumadin, Lovenox, Fragmin or other blood thinners. 5.  Do not smoke and do not drink alcoholic beverages 24 hours prior to surgery. This includes NA Beer. 6.  You may brush your teeth and gargle the morning of surgery. DO NOT SWALLOW WATER.  7.  You MUST make arrangements for a responsible adult to take you home after your surgery. You will not be allowed to leave alone or drive yourself home. It is strongly suggested someone stay with you the first 24 hours. Your surgery will be cancelled if you do not have a ride home. 8.  A parent/legal guardian must accompany a child scheduled for surgery and plan to stay at the hospital until the child is discharged. Please do not bring other children with you. 9.  Please wear simple, loose fitting clothing to the hospital.  Heloise Mates not bring valuables ( money, credit cards, checkbooks, etc.)  Do not wear any makeup (including no eye makeup) or nail polish on your fingers or toes. 10.  Do not wear any jewelry or piercing on the day of surgery. All body piercing jewelry must be removed. 11.  If you have dentures, they will be removed before going to the OR; we will provide you a container. If you wear contact lenses or glasses, they will be removed; please bring a case for them.   15.  Notify your Surgeon if you develop any illness between now and surgery time; cough, cold, fever, sore throat, nausea, vomiting, etc.  Please notify your surgeon if you experience dizziness, shortness of breath or blurred vision between now and the time of your surgery. To provide excellent care, visitors will be limited to two in a room at any given time. Please no children under the age of 15 in the surgical department.

## 2022-08-24 NOTE — PROGRESS NOTES
PAT completed with patient orders placed per MD, pt states her sister Du Nguyễn will be her  and caregiver day of procedure. Fidelia Arroyo RN

## 2022-08-26 ENCOUNTER — ANESTHESIA EVENT (OUTPATIENT)
Dept: ENDOSCOPY | Age: 62
End: 2022-08-26
Payer: COMMERCIAL

## 2022-08-26 ENCOUNTER — ANESTHESIA (OUTPATIENT)
Dept: ENDOSCOPY | Age: 62
End: 2022-08-26
Payer: COMMERCIAL

## 2022-08-26 ENCOUNTER — HOSPITAL ENCOUNTER (OUTPATIENT)
Age: 62
Setting detail: OUTPATIENT SURGERY
Discharge: HOME | End: 2022-08-26
Attending: INTERNAL MEDICINE | Admitting: INTERNAL MEDICINE
Payer: COMMERCIAL

## 2022-08-26 VITALS
WEIGHT: 234 LBS | RESPIRATION RATE: 16 BRPM | TEMPERATURE: 98 F | HEART RATE: 74 BPM | BODY MASS INDEX: 37.61 KG/M2 | OXYGEN SATURATION: 100 % | SYSTOLIC BLOOD PRESSURE: 191 MMHG | DIASTOLIC BLOOD PRESSURE: 101 MMHG | HEIGHT: 66 IN

## 2022-08-26 DIAGNOSIS — K62.9 RECTAL LESION: ICD-10-CM

## 2022-08-26 PROCEDURE — 2580000003 HC RX 258: Performed by: NURSE ANESTHETIST, CERTIFIED REGISTERED

## 2022-08-26 PROCEDURE — 7100000010 HC PHASE II RECOVERY - FIRST 15 MIN: Performed by: INTERNAL MEDICINE

## 2022-08-26 PROCEDURE — 3700000001 HC ADD 15 MINUTES (ANESTHESIA): Performed by: INTERNAL MEDICINE

## 2022-08-26 PROCEDURE — 88305 TISSUE EXAM BY PATHOLOGIST: CPT

## 2022-08-26 PROCEDURE — 7100000011 HC PHASE II RECOVERY - ADDTL 15 MIN: Performed by: INTERNAL MEDICINE

## 2022-08-26 PROCEDURE — 3609009900 HC COLONOSCOPY W/CONTROL BLEEDING ANY METHOD: Performed by: INTERNAL MEDICINE

## 2022-08-26 PROCEDURE — 6360000002 HC RX W HCPCS: Performed by: NURSE ANESTHETIST, CERTIFIED REGISTERED

## 2022-08-26 PROCEDURE — 3700000000 HC ANESTHESIA ATTENDED CARE: Performed by: INTERNAL MEDICINE

## 2022-08-26 PROCEDURE — 3609010600 HC COLONOSCOPY POLYPECTOMY SNARE/COLD BIOPSY: Performed by: INTERNAL MEDICINE

## 2022-08-26 PROCEDURE — 2720000010 HC SURG SUPPLY STERILE: Performed by: INTERNAL MEDICINE

## 2022-08-26 PROCEDURE — 2709999900 HC NON-CHARGEABLE SUPPLY: Performed by: INTERNAL MEDICINE

## 2022-08-26 DEVICE — WORKING LENGTH 235CM, WORKING CHANNEL 2.8MM
Type: IMPLANTABLE DEVICE | Status: FUNCTIONAL
Brand: RESOLUTION 360 CLIP

## 2022-08-26 RX ORDER — SODIUM CHLORIDE, SODIUM LACTATE, POTASSIUM CHLORIDE, CALCIUM CHLORIDE 600; 310; 30; 20 MG/100ML; MG/100ML; MG/100ML; MG/100ML
INJECTION, SOLUTION INTRAVENOUS CONTINUOUS PRN
Status: DISCONTINUED | OUTPATIENT
Start: 2022-08-26 | End: 2022-08-26 | Stop reason: SDUPTHER

## 2022-08-26 RX ORDER — PROPOFOL 10 MG/ML
INJECTION, EMULSION INTRAVENOUS PRN
Status: DISCONTINUED | OUTPATIENT
Start: 2022-08-26 | End: 2022-08-26 | Stop reason: SDUPTHER

## 2022-08-26 RX ORDER — SODIUM CHLORIDE, SODIUM LACTATE, POTASSIUM CHLORIDE, CALCIUM CHLORIDE 600; 310; 30; 20 MG/100ML; MG/100ML; MG/100ML; MG/100ML
INJECTION, SOLUTION INTRAVENOUS CONTINUOUS
Status: DISCONTINUED | OUTPATIENT
Start: 2022-08-26 | End: 2022-08-26 | Stop reason: HOSPADM

## 2022-08-26 RX ADMIN — PROPOFOL 50 MG: 10 INJECTION, EMULSION INTRAVENOUS at 10:12

## 2022-08-26 RX ADMIN — PROPOFOL 50 MG: 10 INJECTION, EMULSION INTRAVENOUS at 10:15

## 2022-08-26 RX ADMIN — PROPOFOL 50 MG: 10 INJECTION, EMULSION INTRAVENOUS at 10:03

## 2022-08-26 RX ADMIN — PROPOFOL 50 MG: 10 INJECTION, EMULSION INTRAVENOUS at 10:06

## 2022-08-26 RX ADMIN — PROPOFOL 50 MG: 10 INJECTION, EMULSION INTRAVENOUS at 10:09

## 2022-08-26 RX ADMIN — SODIUM CHLORIDE, POTASSIUM CHLORIDE, SODIUM LACTATE AND CALCIUM CHLORIDE: 600; 310; 30; 20 INJECTION, SOLUTION INTRAVENOUS at 09:55

## 2022-08-26 RX ADMIN — PROPOFOL 50 MG: 10 INJECTION, EMULSION INTRAVENOUS at 10:18

## 2022-08-26 RX ADMIN — PROPOFOL 100 MG: 10 INJECTION, EMULSION INTRAVENOUS at 10:00

## 2022-08-26 ASSESSMENT — PAIN SCALES - GENERAL
PAINLEVEL_OUTOF10: 0
PAINLEVEL_OUTOF10: 0

## 2022-08-26 ASSESSMENT — PAIN - FUNCTIONAL ASSESSMENT: PAIN_FUNCTIONAL_ASSESSMENT: NONE - DENIES PAIN

## 2022-08-26 NOTE — PROCEDURES
Colonoscopy Procedure  Note          Patient: Zeke Antoine  : 1960      Procedure: Colonoscopy with hot snare polypectomy and cold snare polypectomy    Date:  2022    Primary Care Physician: TAIWO Dawkins - CNP     Operative surgeon: Ki Munguia MD  Previous Colonoscopy: None  Consent: I explained and discussed the risk, benefits and alternatives for the procedure with the patient and obtained the patient's consent for the procedure. We discussed the specific risks including bleeding, perforation, post-procedure abdominal pain, and missed lesions which could lead to interval colorectal cancers. History: 69-year-old with history of lymphoma. Recent PET scan with uptake in the cecum. Colonoscopy to assess     Past Medical History:   Diagnosis Date    Lymphoma ([facility] [address]     Thyroid disease       Preoperative Diagnosis: Abnormal PET scan of the colon  Post Operative Diagnosis: Colon polyps internal hemorrhoids  ASA: 3  SEDATION: MAC      Procedures Performed: Colonoscopy   Scope Type: Adult    Procedure Details:      With the patient in left lateral decubitus position the endoscope was inserted through the anorectal area into the rectum. The scope was then advanced through the length of the colon to the cecum and terminal ileum the quality of preparation was good. The scope was carefully withdrawn with careful inspection. Images were taken of the multiple segments of colon, cecum, IC valve, rectum, terminal ileum. Retroflexion was preformed in the rectum. Cecum Intubated: Yes  EBL: minimal to none  Complications:  no complications were noted  Post-operative Findings: Perianal exam unremarkable, digital rectal exam unremarkable, retroflexion the rectum demonstrated moderate size internal hemorrhoids    In the cecum there was a 1 cm pedunculated polyp that was going to be removed with hot snare however polypectomy was performed without cautery.   No significant bleeding at time of polypectomy however 2 clips were placed to prevent due to no cautery effect. In the ascending colon there was a 1.7 cm semipedunculated polyp that was removed with hot snare resection retrieval complete this was retrieved by pulling the scope out. 1 clip was placed to prevent post polypectomy bleeding    A smaller 5 mm ascending colon polyp removed with cold snare resection retrieval complete. This was placed in the ascending jar. A 4 mm sessile polyp removed from the descending colon cold snare resection retrieval complete    Otherwise the colonic mucosa was normal.  There is no thickening inflammation or other signs concerning for lymphoma involving the colon. The terminal ileum was intubated was normal        Plan: Repeat colonoscopy based on path likely 1 to 3 years. Avoid NSAIDs for the next 14 days. I suspect the PET scan picked up the right side of colon polyps. The polyps in the cecum did not appear to be lymphoma appear to be adenomatous based on pit pattern. The larger ascending colon polyp I would expect to be more likely to demonstrate on PET scan had some atypical features but still favored colonic in origin. Signed By: MD Елена Heart MD,   [facility]  8/26/2022      Please note that some or all of this record was generated using voice recognition software. If there are any questions about the content of this document, please contact the author as some errors in translation may have occurred.

## 2022-08-26 NOTE — H&P
[address]   Pre-operative History and Physical    Patient: Joan Flor  : 1960  Acct#:     HISTORY OF PRESENT ILLNESS:    The patient is a 60 y.o. female who presents for history of floccular lymphoma recent PET scan with uptake in the cecum. Indications: Abnormal PET scan of the cecum    Past Medical History:        Diagnosis Date    Lymphoma ([address]     Thyroid disease       Past Surgical History:        Procedure Laterality Date    APPENDECTOMY      KNEE ARTHROSCOPY Right 10/20/2020    EXAM UNDER ANESTHESIA VIDEO ARTHROSCOPY RIGHT KNEE, MANIPULATION, SYNOVECTOMY performed by Cara Robles MD at [facility] [place] [address] Right 2020    RIGHT TOTAL KNEE REPLACEMENT      MALHOTRA & NEPHEW performed by Cara Robles MD at [place] [address]      Medications Prior to Admission:   No current facility-administered medications on file prior to encounter.      Current Outpatient Medications on File Prior to Encounter   Medication Sig Dispense Refill    meclizine (ANTIVERT) 12.5 MG tablet Take 12.5 mg by mouth 3 times daily as needed      levothyroxine (SYNTHROID) 75 MCG tablet Take 75 mcg by mouth Daily          Allergies:  Oxycodone    Social History:   Social History     Socioeconomic History    Marital status:      Spouse name: Not on file    Number of children: Not on file    Years of education: Not on file    Highest education level: Not on file   Occupational History    Not on file   Tobacco Use    Smoking status: Former     Types: Cigarettes     Quit date: 2004     Years since quittin.1    Smokeless tobacco: Never   Vaping Use    Vaping Use: Never used   Substance and Sexual Activity    Alcohol use: Never    Drug use: Never    Sexual activity: Not on file   Other Topics Concern    Not on file   Social History Narrative    Not on file     Social Determinants of Health     Financial Resource Strain: Not on file   Food Insecurity: Not on file   Transportation Needs: Not on file   Physical Activity: Not on file   Stress: Not on file   Social Connections: Not on file   Intimate Partner Violence: Not on file   Housing Stability: Not on file      Family History:       Problem Relation Age of Onset    Diabetes Mother     Cancer Father         LUNG CA        PHYSICAL EXAM:      BP (!) 174/97   Pulse 90   Temp 98.7 °F (37.1 °C) (Temporal)   Resp 16   Ht 5' 6\" (1.676 m)   Wt 234 lb (106.1 kg)   SpO2 97%   BMI 37.77 kg/m²  I        Heart:  Normal apical impulse, regular rate and rhythm, normal S1 and S2, no S3 or S4, and no murmur noted    Lungs:  No increased work of breathing, good air exchange, clear to auscultation bilaterally, no crackles or wheezing    Abdomen:  No scars, normal bowel sounds, soft, non-distended, non-tender, no masses palpated, no hepatosplenomegally      ASA Class  ASA 3 - Patient with moderate systemic disease with functional limitations    Mallampati Class: 3      ASSESSMENT AND PLAN:    1. Patient is a suitable candidate for endoscopic procedure and attendant anesthesia  2. Risks, benefits, alternatives of procedure discussed in detail with patient including risks of bleeding, infection, perforation, risks of sedation, risks of missed lesions. The patient wishes to proceed.

## 2022-08-26 NOTE — ANESTHESIA PRE PROCEDURE
Department of Anesthesiology  Preprocedure Note       Name:  Julia Mcgowan   Age:  60 y.o.  :  1960                                          MRN:  [id number]         Date:  2022      Surgeon: Deonte Gallagher):  Ilana Andrea MD    Procedure: Procedure(s):  COLON W/ANES. (10:00)    Medications prior to admission:   Prior to Admission medications    Medication Sig Start Date End Date Taking? Authorizing Provider   meclizine (ANTIVERT) 12.5 MG tablet Take 12.5 mg by mouth 3 times daily as needed   Yes Historical Provider, MD   levothyroxine (SYNTHROID) 75 MCG tablet Take 75 mcg by mouth Daily   Yes Historical Provider, MD       Current medications:    No current facility-administered medications for this encounter. Current Outpatient Medications   Medication Sig Dispense Refill    meclizine (ANTIVERT) 12.5 MG tablet Take 12.5 mg by mouth 3 times daily as needed      levothyroxine (SYNTHROID) 75 MCG tablet Take 75 mcg by mouth Daily         Allergies:     Allergies   Allergen Reactions    Oxycodone Nausea And Vomiting       Problem List:    Patient Active Problem List   Diagnosis Code    Status post total right knee replacement Z96.651    S/P total knee arthroplasty, right Z96.651       Past Medical History:        Diagnosis Date    Thyroid disease        Past Surgical History:        Procedure Laterality Date    APPENDECTOMY      KNEE ARTHROSCOPY Right 10/20/2020    EXAM UNDER ANESTHESIA VIDEO ARTHROSCOPY RIGHT KNEE, MANIPULATION, SYNOVECTOMY performed by Darryl Martin MD at [address] [place] [address] Right 2020    RIGHT TOTAL KNEE REPLACEMENT      MALHOTRA & NEPHEW performed by Darryl Martin MD at [place] [address] History:    Social History     Tobacco Use    Smoking status: Former     Types: Cigarettes     Quit date: 2004     Years since quittin.1    Smokeless tobacco: Never   Substance Use Topics    Alcohol use: Never Counseling given: Not Answered      Vital Signs (Current):   Vitals:    08/24/22 1258   Weight: 234 lb (106.1 kg)   Height: 5' 6\" (1.676 m)                                              BP Readings from Last 3 Encounters:   10/20/20 (!) 140/64   10/20/20 (!) 144/73   07/15/20 (!) 100/59       NPO Status:                                                                                 BMI:   Wt Readings from Last 3 Encounters:   05/11/21 228 lb (103.4 kg)   12/10/20 227 lb (103 kg)   11/16/20 227 lb (103 kg)     Body mass index is 37.77 kg/m². CBC:   Lab Results   Component Value Date/Time    WBC 6.7 07/15/2020 05:39 AM    RBC 3.80 07/15/2020 05:39 AM    HGB 11.1 07/15/2020 05:39 AM    HCT 33.5 07/15/2020 05:39 AM    MCV 88.1 07/15/2020 05:39 AM    RDW 13.9 07/15/2020 05:39 AM     07/15/2020 05:39 AM       CMP:   Lab Results   Component Value Date/Time     07/15/2020 05:39 AM    K 3.7 07/15/2020 05:39 AM     07/15/2020 05:39 AM    CO2 24 07/15/2020 05:39 AM    BUN 11 07/15/2020 05:39 AM    CREATININE <0.5 07/15/2020 05:39 AM    GFRAA >60 07/15/2020 05:39 AM    LABGLOM >60 07/15/2020 05:39 AM    GLUCOSE 128 07/15/2020 05:39 AM    CALCIUM 8.3 07/15/2020 05:39 AM       POC Tests: No results for input(s): POCGLU, POCNA, POCK, POCCL, POCBUN, POCHEMO, POCHCT in the last 72 hours.     Coags: No results found for: PROTIME, INR, APTT    HCG (If Applicable): No results found for: PREGTESTUR, PREGSERUM, HCG, HCGQUANT     ABGs: No results found for: PHART, PO2ART, HJW2VGJ, BVW0VFD, BEART, C8BUUHKE     Type & Screen (If Applicable):  No results found for: LABABO, LABRH    Drug/Infectious Status (If Applicable):  No results found for: HIV, HEPCAB    COVID-19 Screening (If Applicable):   Lab Results   Component Value Date/Time    COVID19 Not Detected 10/14/2020 09:01 AM           Anesthesia Evaluation  Patient summary reviewed and Nursing notes reviewed no history of anesthetic complications:   Airway: Mallampati: II     Neck ROM: full     Dental:          Pulmonary:Negative Pulmonary ROS and normal exam                               Cardiovascular:Negative CV ROS                      Neuro/Psych:   Negative Neuro/Psych ROS              GI/Hepatic/Renal: Neg GI/Hepatic/Renal ROS       (-) hiatal hernia and GERD       Endo/Other: Negative Endo/Other ROS   (+) hypothyroidism::., .                 Abdominal:             Vascular: Other Findings:           Anesthesia Plan      general     ASA 2     (I discussed with the patient the risks and benefits of PIV, general anesthesia, IV Narcotics, PACU. All questions were answered the patient agrees with the plan and wishes to proceed.  )  Induction: intravenous.                             Barry Farr MD   8/26/2022

## 2022-08-26 NOTE — ANESTHESIA POSTPROCEDURE EVALUATION
11                  07/15/2020 05:39 AM        CREATININE               <0.5 (L)            07/15/2020 05:39 AM        GLUCOSE                  128 (H)             07/15/2020 05:39 AM   COAGS  No results found for: PROTIME, INR, APTT    Intake & Output:  @78PGOQ@    Nausea & Vomiting:  No    Level of Consciousness:  Awake    Pain Assessment:  Adequate analgesia    Anesthesia Complications:  No apparent anesthetic complications    SUMMARY      Vital signs stable  OK to discharge from Stage I post anesthesia care.   Care transferred from Anesthesiology department on discharge from perioperative area

## 2022-08-26 NOTE — PROGRESS NOTES
Reviewed discharge instructions with patient and sister Anna Roper, they verbalized understanding. Card for clips given. Patient states she is ready for discharge.

## 2022-08-26 NOTE — DISCHARGE INSTRUCTIONS
ADULT DERMATOLOGY PROGRESS NOTE      3/10/2020  Francy Tovar  1960  MRN: [id number]      Francy Tovar is an established 59 year old female patient with pmhx of Vitiligo, last seen 1/8/2020 for vitiligo. She applied protopic ointment 0.1% TIW to dorsal hands with insignificant improvement. She presents with the following cutaneous concerns:     #1  Location: left outer canthus lesion  Onset:  1 month  POSITIVE qualities: painful at times  NEGATIVE qualities: bleeding, crusty, growing, changing and itchy  Triggers: None  Modifying factors: none  Treatment:     Previous: none      #2  Location: vertex- Indentation previously diagnosed as seb derm  Onset: 4 months   Duration: comes and goes  POSITIVE qualities: itchy at times  NEGATIVE qualities: painful  Triggers: none  Treatment:     Previous: wet brush   Current: none    #3  Location: sternum- lesion  Onset: 5-6 months  Duration: constant  POSITIVE qualities: none  NEGATIVE qualities: itching, scabbing, bleeding, painful  Treatment: none      Patient feels otherwise well with no additional cutaneous concerns today.          PAST DERMATOLOGIC HISTORY:   Eyelid eczema  Negative Skin cancer  Vitiligo  Tinea pedis      Past Medical History:   Diagnosis Date   • Allergy    • Anxiety    • Atrial tachycardia (CMS/HCC)     On Flecainide and Atenolol   • Bronchitis    • Deep vein thrombosis (CMS/HCC)    • Eczema     plantar surface of both feet   • GERD (gastroesophageal reflux disease)    • Heart murmur    • Malignant neoplasm (CMS/HCC)     Breast (right sided)   • Mitral valve prolapse    • Obesity, unspecified    • SVT (supraventricular tachycardia) (CMS/HCC)     AV node reentry brief  non-sustained   • Urticaria 4/14/2015   • Vitamin D deficiency 1/5/2015    1/2/15 - 25.1  Starting vit D 50,000 IU weekly for 12 weeks.(N.AsencioNP1/5/15)          FAMILY HISTORY:   History of skin cancer--Negative    SOCIAL HISTORY:   Tanning Bed Use:  yes     Tobacco use:   PATIENT INSTRUCTIONS  POST-SEDATION    Evelyn Long          IMMEDIATELY FOLLOWING PROCEDURE:    Do not drive or operate machinery for the first twenty four hours after surgery. Do not make any important decisions for twenty four hours after surgery or while taking narcotic pain medications or sedatives. You should NOT BE LEFT UNATTENDED OR ALONE. A responsible adult should be with you for the rest of the day of your procedure and also during the night for your protection and safety. You may experience some light headedness. Rest at home with activity as tolerated. You may not need to go to bed, but it is important to rest for the next 24 hours. You should not engage in athletic sports such as basketball, volleyball, jogging, skating, or activities requiring refined motor skills for 24 hours. If you develop intractable nausea and vomiting or a severe headache please notify your doctor immediately. You are not expected to have any fever, but if you feel warm, take your temperature. If you have a fever 101 degrees or higher, call your doctor. If you have had an Endoscopy:   *Eat lightly for your first meal and gradually resume your normal / prescribed diet. DO NOT eat or drink until your gag reflex returns. *If you have a sore throat you may use lozenges, or salt water gargles. *If you have had a colonoscopy, do not expect a normal bowel movement for approximately three days due to the cleansing of the large intestine prior to colonoscopy. ONCE YOU ARE HOME, IF YOU SHOULD HAVE:  Difficulty in breathing, persistent nausea or vomiting, bleeding you feel is excessive, or pain that is unusual, increased abdominal bloating, or any swelling, fever / chills, call your physician. If you cannot contact your physician, but feel that your signs and symptoms need a physician's attention, go to the Emergency Department. FOLLOW-UP:    Please follow up with family doctor as scheduled or needed.      [facility] office will call you with the biopsy findings. Call  [facility] if there are any GI concerns. [phone]      Repeat Colonoscopy in 10 years. Repeat Colonoscopy based on polyp results. yes     ETOH:  yes     Illicit drug use:  no     Occupation:    ALLERGIES:   Allergen Reactions   • Levaquin Palpitations       Current Outpatient Medications   Medication Sig Dispense Refill   • flecainide (TAMBOCOR) 100 MG tablet Take 1 tablet by mouth twice daily 180 tablet 0   • betamethasone dipropionate augmented (DIPROLENE AF) 0.05 % cream Apply to hands only three times a week for one month. 30 g 0   • tacrolimus (PROTOPIC) 0.1 % ointment Apply to affected areas on hands twice daily 45 g 0   • apixaBAN (ELIQUIS) 5 MG Tab Take 1 tablet by mouth every 12 hours. 180 tablet 3   • letrozole (FEMARA) 2.5 MG tablet TAKE 1 TABLET BY MOUTH ONCE DAILY 90 tablet 3   • pantoprazole (PROTONIX) 40 MG tablet Take 1 tablet by mouth daily. 90 tablet 3   • atenolol (TENORMIN) 25 MG tablet TAKE 1 TABLET BY MOUTH ONCE DAILY 90 tablet 3   • Inositol Niacinate (NIACIN FLUSH FREE) 500 MG Cap Take 250 mg by mouth.      • Coenzyme Q10 (CO Q-10) 200 MG Cap Take 200 mg by mouth.     • Omega-3 Fatty Acids (FISH OIL) 1000 MG capsule Take 2 g by mouth 3 times daily (with meals).     • clonazePAM (KLONOPIN) 0.5 MG tablet Take 0.5-1 tablets by mouth daily as needed for Anxiety. 15 tablet 0   • Calcium Carbonate (CALCIUM 600 PO) Take 1,200 mg by mouth daily.      • desonide (DESOWEN) 0.05 % ointment Apply to affected area of face twice daily for two weeks. 15 g 0   • acetaminophen (TYLENOL) 500 MG tablet Take 500 mg by mouth every 6 hours as needed for Pain.     • Cholecalciferol (VITAMIN D) 2000 UNITS tablet Take 2,000 Units by mouth daily.     • triamcinolone (NASACORT ALLERGY 24HR) 55 MCG/ACT nasal inhaler Spray 2 sprays in each nostril daily.      • Multiple Vitamins-Calcium (ONE-A-DAY WOMENS FORMULA) TABS Take 1 tablet by mouth daily.       No current facility-administered medications for this visit.        REVIEW OF SYSTEMS:  No nausea, vomiting, fevers, chills, diarrhea, joint pain, weight loss, fatigue, cough, dizziness, weakness,  depression, headaches, blood in stool/urine, visual disturbance, decreased appetite.    EXAM:   Visit Vitals  Ht 5' 1\" (1.549 m) Comment: pt report   Wt 76.2 kg Comment: historical   LMP 07/05/2009 (Approximate)   Breastfeeding No   BMI 31.74 kg/m²       The patient is a well developed Hanley type 2 female and is alert and oriented x 3, normal mood and affect and is in no acute distress. Examination of the scalp/body hair, face, chest, right lower extremity, left lower extremity and digits/nails was performed and findings were within normal limits unless specified below (Physical exam findings noted in A/P)    IMPRESSION / PLAN:     Milia (smooth minute well-circumscribed whitish papule left lateral canthus)  - Benign nature of condition as retained keratin cysts under the skin reviewed.  Retinoids can be helpful and rarely the condition can resolve spontaneously, but often requires surgical extraction which is not covered by insurance.  - treatment options discussed including extraction. 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    Vitiligo (depigmented patches on the dorsal hands)  -patient with localized  - counseled on the etiology of this condition  - discussed that vitiligo can occur in association with other autoimmune conditions including autoimmune thyroid dysfunction as well as endocrinopathies including diabetes and Brendan disease and can sometimes be hereditary   - discussed that this condition can be unpredictable and that not all patients respond to treatment  -discussed the risks and benefits of treatment options  -counseled regarding important of photoprotection  - continue Protopic ointment 0.1%  applied to the BID to the hands, discussed side effects of topical steroids including atrophy,  dyschromia, and striae development and importance of discontinuing its use if any of these develop  -offered to refer her to Dr Grullon at [facility] for laser treatment. She declined    Slight depression in the contour of her vertex. No change noted    Dyshidrotic Dermatitis (grouped vessicles along right medial heel, vessicles along left lateral plantar foot)  -discussed etiology of condition  -discussed anti irritant regimen with dry skin handout provided  - start clobetasol 0.05% cream BID x2 weeks applied to the plantar feet, discussed side effects of topical steroids including atrophy, dyschromia, and striae development and importance of discontinuing its use if any of these develop      RTC: annual FBSE    Call sooner if any problems or concerns.      I, \"Kennedi Quintana RN\" attest that I performed the duties of a scribe for this entire encounter in the presence of Jhoana FERNANDEZ, and the patient.  I, Jhoana Thomas NP, attest that I performed all of the work during this encounter and that the scribe only recorded my findings.

## 2023-06-13 NOTE — PROGRESS NOTES
Dejan Raeann    Age 63 y.o.    female    1960    N [id number]    10/20/2020  Arrival Time_____________  OR Time____________[address]     Procedure(s):  EXAM UNDER ANESTHESIA VIDEO ARTHROSCOPY RIGHT KNEE, MANIPULATION, SYNOVECTOMY                      General    Surgeon(s):  Bethany Elizalde, MD       Phone [phone] (home) [phone] (work)    [address] [place] [address]  Cell Work  _________________________________________________________________  _________________________________________________________________  _________________________________________________________________  _________________________________________________________________  _________________________________________________________________      PCP _____________________________ Phone_________________       H&P__________________Bringing    Chart            Epic  DOS     Called_______  EKG__________________Bringing    Chart            Epic  DOS     Called_______  LAB__________________ Bringing    Chart            Epic  DOS     Called_______  Cardiac Clearance_______Bringing    Chart            Epic      DOS       Called_______    Cardiologist________________________ Phone___________________________      ? Caodaism concerns / Waiver on Chart            PAT Communications_____________  ? Pre-op Instructions Given [place]          ______________________________  ? Directions to Surgery Center                          ______________________________  ? Transportation Home_______________      _______________________________  ?  Crutches/Walker__________________        _______________________________      ________Pre-op Orders   _______Transcribed    _______Wt.  ________Pharmacy          _______SCD  ______VTE     ______Beta Blocker  ________Consent             ________TED Kiko Retort How Severe Are Your Spot(S)?: mild What Type Of Note Output Would You Prefer (Optional)?: Bullet Format What Is The Reason For Today's Visit?: Full Body Skin Examination What Is The Reason For Today's Visit? (Being Monitored For X): concerning skin lesions on an annual basis

## (undated) DEVICE — 3M™ TEGADERM™ TRANSPARENT FILM DRESSING FRAME STYLE, 1624W, 2-3/8 IN X 2-3/4 IN (6 CM X 7 CM), 100/CT 4CT/CASE: Brand: 3M™ TEGADERM™

## (undated) DEVICE — SUTURE VCRL + SZ 2-0 L18IN ABSRB UD CT1 L36MM 1/2 CIR VCP839D

## (undated) DEVICE — TRAP POLYP ETRAP

## (undated) DEVICE — HOOD, PEEL-AWAY: Brand: FLYTE

## (undated) DEVICE — AMBIENT SUPER TURBOVAC 90: Brand: COBLATION

## (undated) DEVICE — GLOVE SURG SZ 65 L12IN FNGR THK94MIL STD WHT LTX FREE

## (undated) DEVICE — SNARE ENDOSCP AD L240CM LOOP W10MM SHTH DIA2.4MM RND INSUL

## (undated) DEVICE — ENDO CARRY-ON PROCEDURE KIT INCLUDES SUCTION TUBING, LUBRICANT, GAUZE, BIOHAZARD STICKER, TRANSPORT PAD AND INTERCEPT BEDSIDE KIT.: Brand: ENDO CARRY-ON PROCEDURE KIT

## (undated) DEVICE — SPLINT KNEE UNIV FOR LESS THAN 36IN L20IN FOAM LAM E CNTCT

## (undated) DEVICE — GOWN,SIRUS,NON REINFRCD,LARGE,SET IN SL: Brand: MEDLINE

## (undated) DEVICE — GLOVE SURG SZ 65 L12IN FNGR THK79MIL GRN LTX FREE

## (undated) DEVICE — SUTURE ETHLN SZ 4-0 L18IN NONABSORBABLE BLK L19MM PS-2 3/8 1667H

## (undated) DEVICE — SOLUTION IV 1000ML LAC RINGERS PH 6.5 INJ USP VIAFLX PLAS

## (undated) DEVICE — STERILE LATEX POWDER-FREE SURGICAL GLOVESWITH NITRILE COATING: Brand: PROTEXIS

## (undated) DEVICE — SUTURE SURGLON SZ 1 L18IN NONABSORBABLE BLK GS 21 L37MM 1 2 8886196272

## (undated) DEVICE — GAUZE,SPONGE,4"X4",16PLY,STRL,LF,10/TRAY: Brand: MEDLINE

## (undated) DEVICE — PADDING CAST W4INXL4YD NONSTERILE COT RAYON MICROPLEATED

## (undated) DEVICE — SUTURE MCRYL + SZ 4-0 L18IN ABSRB UD L19MM PS-2 3/8 CIR MCP496G

## (undated) DEVICE — SOLUTION IRRIG 5L LAC R BG

## (undated) DEVICE — DRILL BIT 3.2MM (1/8'') X 128.0MM

## (undated) DEVICE — 3M™ COBAN™ SELF-ADHERENT WRAP, 1586S, STERILE, 6 IN X 5 YD (15 CM X 4,5 M), 12 ROLLS/CASE: Brand: 3M™ COBAN™

## (undated) DEVICE — ELECTRODE PT RET AD L9FT HI MOIST COND ADH HYDRGEL CORDED

## (undated) DEVICE — OPTIFOAM GENTLE SA, POSTOP, 4X12: Brand: MEDLINE

## (undated) DEVICE — SPONGE LAP W18XL18IN WHT COT 4 PLY FLD STRUNG RADPQ DISP ST

## (undated) DEVICE — COVER,TABLE,HEAVY DUTY,50"X90",STRL: Brand: MEDLINE

## (undated) DEVICE — ZIMMER® STERILE DISPOSABLE TOURNIQUET CUFF WITH PLC, DUAL PORT, SINGLE BLADDER, 34 IN. (86 CM)

## (undated) DEVICE — ZIMMER® STERILE DISPOSABLE TOURNIQUET CUFF WITH PLC, DUAL PORT, SINGLE BLADDER, 30 IN. (76 CM)

## (undated) DEVICE — 3 BONE CEMENT MIXER: Brand: MIXEVAC

## (undated) DEVICE — 4.5 MM INCISOR PLUS STRAIGHT                                    BLADES, POWER/EP-1, VIOLET, PACKAGED                                    6 PER BOX, STERILE

## (undated) DEVICE — Device

## (undated) DEVICE — SYSTEM SKIN CLSR 22CM DERMBND PRINEO

## (undated) DEVICE — SMARTGOWN BREATHABLE SURGICAL GOWN: Brand: CONVERTORS

## (undated) DEVICE — NEEDLE HYPO 20GA L1.5IN YEL POLYPR HUB S STL REG BVL STR

## (undated) DEVICE — ELECTRODE,RADIOTRANSLUCENT,FOAM,3PK: Brand: MEDLINE

## (undated) DEVICE — Z CONVERTED USE 2271377 BANDAGE COMPR W6INXL5YD EC E REB

## (undated) DEVICE — SMARTGOWN SURGICAL GOWN, LARGE: Brand: CONVERTORS

## (undated) DEVICE — PACK PROCEDURE SURG ARTHSCP CUST

## (undated) DEVICE — GENESIS TROCHLEAR PIN 1/8 X 3: Brand: GENESIS

## (undated) DEVICE — STERILE POLYISOPRENE POWDER-FREE SURGICAL GLOVES: Brand: PROTEXIS

## (undated) DEVICE — PENCIL SMK EVAC ALL IN 1 DSGN ENH VISIBILITY IMPROVED AIR

## (undated) DEVICE — BLADE SURG SAW SAG S STL AGG 905MM LEN 185MM W 127MM THCK

## (undated) DEVICE — Z CONVERTED USE 2273232 BANDAGE COMPR W6INXL11YD E KNIT DBL SELF CLSR EZE-BAND

## (undated) DEVICE — SET ADMIN PRIMING 7ML L30IN 7.35LB 20 GTT 2ND RLER CLMP

## (undated) DEVICE — TUBE IRRIG L8IN LNG PT W/ CONN FOR PMP SYS REDEUCE

## (undated) DEVICE — T-DRAPE,EXTREMITY,STERILE: Brand: MEDLINE

## (undated) DEVICE — PAD,ABDOMINAL,8"X10",ST,LF: Brand: MEDLINE

## (undated) DEVICE — SOLUTION IRRIG 2000ML 0.9% SOD CHL USP UROMATIC PLAS CONT

## (undated) DEVICE — SET GRAV VENT NVENT CK VLV 3 NDL FREE PRT 10 GTT

## (undated) DEVICE — Device: Brand: DISPOSABLE ELECTROSURGICAL SNARE

## (undated) DEVICE — SMARTGOWN SURGICAL GOWN, XL: Brand: CONVERTORS

## (undated) DEVICE — HANDPIECE SET WITH HIGH FLOW TIP AND SUCTION TUBE: Brand: INTERPULSE

## (undated) DEVICE — CATHETER IV 20GA L1.25IN PNK FEP SFTY STR HUB RADPQ DISP